# Patient Record
Sex: FEMALE | Race: WHITE | NOT HISPANIC OR LATINO | Employment: OTHER | URBAN - METROPOLITAN AREA
[De-identification: names, ages, dates, MRNs, and addresses within clinical notes are randomized per-mention and may not be internally consistent; named-entity substitution may affect disease eponyms.]

---

## 2022-08-08 ENCOUNTER — OFFICE VISIT (OUTPATIENT)
Dept: RHEUMATOLOGY | Facility: CLINIC | Age: 67
End: 2022-08-08
Payer: MEDICARE

## 2022-08-08 ENCOUNTER — LAB VISIT (OUTPATIENT)
Dept: LAB | Facility: HOSPITAL | Age: 67
End: 2022-08-08
Attending: INTERNAL MEDICINE
Payer: MEDICARE

## 2022-08-08 VITALS
BODY MASS INDEX: 30.16 KG/M2 | WEIGHT: 175.69 LBS | SYSTOLIC BLOOD PRESSURE: 115 MMHG | DIASTOLIC BLOOD PRESSURE: 66 MMHG

## 2022-08-08 DIAGNOSIS — M45.9 ANKYLOSING SPONDYLITIS OF UNSPECIFIED SITES IN SPINE: ICD-10-CM

## 2022-08-08 DIAGNOSIS — M79.7 FIBROMYALGIA: ICD-10-CM

## 2022-08-08 DIAGNOSIS — R76.8 POSITIVE ANA (ANTINUCLEAR ANTIBODY): ICD-10-CM

## 2022-08-08 DIAGNOSIS — Z87.19 HISTORY OF COLITIS: ICD-10-CM

## 2022-08-08 DIAGNOSIS — R76.8 POSITIVE ANA (ANTINUCLEAR ANTIBODY): Primary | ICD-10-CM

## 2022-08-08 LAB
CRP SERPL-MCNC: 0.25 MG/DL
ERYTHROCYTE [SEDIMENTATION RATE] IN BLOOD BY WESTERGREN METHOD: 8 MM/HR (ref 0–20)

## 2022-08-08 PROCEDURE — 3008F BODY MASS INDEX DOCD: CPT | Mod: CPTII,S$GLB,, | Performed by: INTERNAL MEDICINE

## 2022-08-08 PROCEDURE — 1159F PR MEDICATION LIST DOCUMENTED IN MEDICAL RECORD: ICD-10-PCS | Mod: CPTII,S$GLB,, | Performed by: INTERNAL MEDICINE

## 2022-08-08 PROCEDURE — 3078F DIAST BP <80 MM HG: CPT | Mod: CPTII,S$GLB,, | Performed by: INTERNAL MEDICINE

## 2022-08-08 PROCEDURE — 86140 C-REACTIVE PROTEIN: CPT | Performed by: INTERNAL MEDICINE

## 2022-08-08 PROCEDURE — 1101F PT FALLS ASSESS-DOCD LE1/YR: CPT | Mod: CPTII,S$GLB,, | Performed by: INTERNAL MEDICINE

## 2022-08-08 PROCEDURE — 1160F PR REVIEW ALL MEDS BY PRESCRIBER/CLIN PHARMACIST DOCUMENTED: ICD-10-PCS | Mod: CPTII,S$GLB,, | Performed by: INTERNAL MEDICINE

## 2022-08-08 PROCEDURE — 3078F PR MOST RECENT DIASTOLIC BLOOD PRESSURE < 80 MM HG: ICD-10-PCS | Mod: CPTII,S$GLB,, | Performed by: INTERNAL MEDICINE

## 2022-08-08 PROCEDURE — 1160F RVW MEDS BY RX/DR IN RCRD: CPT | Mod: CPTII,S$GLB,, | Performed by: INTERNAL MEDICINE

## 2022-08-08 PROCEDURE — 3288F PR FALLS RISK ASSESSMENT DOCUMENTED: ICD-10-PCS | Mod: CPTII,S$GLB,, | Performed by: INTERNAL MEDICINE

## 2022-08-08 PROCEDURE — 1125F PR PAIN SEVERITY QUANTIFIED, PAIN PRESENT: ICD-10-PCS | Mod: CPTII,S$GLB,, | Performed by: INTERNAL MEDICINE

## 2022-08-08 PROCEDURE — 3008F PR BODY MASS INDEX (BMI) DOCUMENTED: ICD-10-PCS | Mod: CPTII,S$GLB,, | Performed by: INTERNAL MEDICINE

## 2022-08-08 PROCEDURE — 86431 RHEUMATOID FACTOR QUANT: CPT | Performed by: INTERNAL MEDICINE

## 2022-08-08 PROCEDURE — 86200 CCP ANTIBODY: CPT | Performed by: INTERNAL MEDICINE

## 2022-08-08 PROCEDURE — 3074F PR MOST RECENT SYSTOLIC BLOOD PRESSURE < 130 MM HG: ICD-10-PCS | Mod: CPTII,S$GLB,, | Performed by: INTERNAL MEDICINE

## 2022-08-08 PROCEDURE — 86376 MICROSOMAL ANTIBODY EACH: CPT | Performed by: INTERNAL MEDICINE

## 2022-08-08 PROCEDURE — 86147 CARDIOLIPIN ANTIBODY EA IG: CPT | Mod: 59 | Performed by: INTERNAL MEDICINE

## 2022-08-08 PROCEDURE — 86235 NUCLEAR ANTIGEN ANTIBODY: CPT | Mod: 59 | Performed by: INTERNAL MEDICINE

## 2022-08-08 PROCEDURE — 1125F AMNT PAIN NOTED PAIN PRSNT: CPT | Mod: CPTII,S$GLB,, | Performed by: INTERNAL MEDICINE

## 2022-08-08 PROCEDURE — 99205 PR OFFICE/OUTPT VISIT, NEW, LEVL V, 60-74 MIN: ICD-10-PCS | Mod: S$GLB,,, | Performed by: INTERNAL MEDICINE

## 2022-08-08 PROCEDURE — 1101F PR PT FALLS ASSESS DOC 0-1 FALLS W/OUT INJ PAST YR: ICD-10-PCS | Mod: CPTII,S$GLB,, | Performed by: INTERNAL MEDICINE

## 2022-08-08 PROCEDURE — 99205 OFFICE O/P NEW HI 60 MIN: CPT | Mod: S$GLB,,, | Performed by: INTERNAL MEDICINE

## 2022-08-08 PROCEDURE — 1159F MED LIST DOCD IN RCRD: CPT | Mod: CPTII,S$GLB,, | Performed by: INTERNAL MEDICINE

## 2022-08-08 PROCEDURE — 81374 HLA I TYPING 1 ANTIGEN LR: CPT | Performed by: INTERNAL MEDICINE

## 2022-08-08 PROCEDURE — 85732 THROMBOPLASTIN TIME PARTIAL: CPT | Performed by: INTERNAL MEDICINE

## 2022-08-08 PROCEDURE — 3288F FALL RISK ASSESSMENT DOCD: CPT | Mod: CPTII,S$GLB,, | Performed by: INTERNAL MEDICINE

## 2022-08-08 PROCEDURE — 85651 RBC SED RATE NONAUTOMATED: CPT | Performed by: INTERNAL MEDICINE

## 2022-08-08 PROCEDURE — 3074F SYST BP LT 130 MM HG: CPT | Mod: CPTII,S$GLB,, | Performed by: INTERNAL MEDICINE

## 2022-08-08 RX ORDER — RIMEGEPANT SULFATE 75 MG/75MG
TABLET, ORALLY DISINTEGRATING ORAL
COMMUNITY
Start: 2022-08-03

## 2022-08-08 RX ORDER — IBUPROFEN 800 MG/1
800 TABLET ORAL 2 TIMES DAILY
COMMUNITY
Start: 2022-08-03 | End: 2022-08-08

## 2022-08-08 RX ORDER — FLUOXETINE HYDROCHLORIDE 20 MG/1
20 CAPSULE ORAL DAILY
Qty: 30 CAPSULE | Refills: 11 | Status: SHIPPED | OUTPATIENT
Start: 2022-08-08 | End: 2023-08-21

## 2022-08-08 RX ORDER — HYDROXYZINE HYDROCHLORIDE 10 MG/1
10-20 TABLET, FILM COATED ORAL NIGHTLY
COMMUNITY
Start: 2022-07-06 | End: 2023-08-21

## 2022-08-08 RX ORDER — ONDANSETRON 4 MG/1
4 TABLET, FILM COATED ORAL EVERY 8 HOURS PRN
COMMUNITY

## 2022-08-08 NOTE — PROGRESS NOTES
A copy of the previous skin biopsy dated 2022, performed by [facility] in [place] was received.  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  This resulted is scanned into this EMR.        [facility] RHEUMATOLOGY           New patient visit    Notes dictated to M*Modal. Please forgive any unintentional errors.  Subjective:       Patient ID:   NAME: Heidi Romero : 1955     67 y.o. female    Referring Doc: No ref. provider found  Other Physicians:    Chief Complaint:  Positive FUAD    HPI:  This patient was referred by her primary care doctor for the evaluation of a positive FUAD.  The blood test was apparently done by Dermatology in response to a rash that broke out approximately 3 months ago and involved the area overlying the sternum.  She describes the rash as warm, raised, pruritic wheals.  Apparently, a biopsy was done and suggested the possibility of lupus.  I am unclear from reviewing the record whether she was told it was discoid or systemic.  In any case, that rash cleared without scarring.  She subsequently developed rashes involving the arms and legs as well as the abdomen.  She was prescribed Plaquenil which she was unable to tolerate, stating that it made me very sick.  She was also given prednisone which she did not like.  A 2nd biopsy was apparently done and suggested a fixed drug reaction, this according to the patient's memory.  Either the dermatologist or the patient herself decided to discontinue most medications to see whether this would resolve the problem.    Her rheumatologic review systems is positive for alopecia, dryness of the eyes, facial skin rash, dysphagia, and reflux.  The review of systems is equivocal for photosensitivity (? heat), and anemia.    Her past medical history is positive for hyperlipidemia with coronary artery disease needing at least 1 stent.  She also has COPD; she is a former smoker.  She has been treated for  anxiety-depression on at least 2 occasions, having been on Prozac and Zoloft.  She is uncertain as to why these were discontinued.  She does relate having anxiety issues at present.    ROS:   GEN:      no fever, night sweats or weight loss  SKIN:     + rashes, bruising, no Raynauds, +/- photosensitivity  HEENT:  no acute changes in vision, no mouth ulcers, + sicca symptoms, no scalp tenderness, jaw claudication.  CV:        no CP, PND, BOYD or orthopnea, no palpitations  PULM:   + SOB, no cough, hemoptysis, sputum or pleuritic pain  GI:          + dysphagia, ++ GERD, no hematemesis; no abdominal pain, nausea, vomiting, constipation, diarrhea, melanotic stools, + hematochezia  :        no hematuria, dysuria  NEURO: no paresthesias, + headaches, no tremors, no seizures  MUSCULOSKELETAL:  No red, hot, and/or swollen joints  PSYCH: + insomnia, + anxiety > depression    Past Medical/Surgical History:  Past Medical History:   Diagnosis Date    COPD (chronic obstructive pulmonary disease)     Coronary artery disease      Past Surgical History:   Procedure Laterality Date    BREAST BIOPSY Right     over 15 yrs ago benign    HYSTERECTOMY         Allergies:  Review of patient's allergies indicates:  No Known Allergies    Social/Family History:  Social History     Socioeconomic History    Marital status:    Tobacco Use    Smoking status: Current Every Day Smoker     Packs/day: 0.50     Years: 50.00     Pack years: 25.00     Types: Cigarettes     Start date: 9/18/1969    Smokeless tobacco: Never Used    Tobacco comment: Patient has smoked off/on since she was 14     No family history on file.  FAMILY HISTORY:  POSITIVE for Connective Tissue Disease      Medications:    Current Outpatient Medications:     albuterol (VENTOLIN HFA) 90 mcg/actuation inhaler, Inhale 2 puffs into the lungs every 6 (six) hours as needed for Wheezing. Rescue, Disp: , Rfl:     aspirin 81 MG Chew, Take 81 mg by mouth once daily., Disp:  , Rfl:     calcium-vitamin D3 (CALCIUM 500 + D) 500 mg(1,250mg) -200 unit per tablet, Take 1 tablet by mouth 2 (two) times daily with meals., Disp: , Rfl:     carboxymethylcellulose (REFRESH PLUS) 0.5 % Dpet, 1 drop 3 (three) times daily as needed., Disp: , Rfl:     cetirizine (ZYRTEC) 10 MG tablet, Take 10 mg by mouth once daily., Disp: , Rfl:     omeprazole (PRILOSEC) 20 MG capsule, Take 20 mg by mouth once daily., Disp: , Rfl:     ranolazine (RANEXA) 500 MG Tb12, Take 1 tablet (500 mg total) by mouth 2 (two) times daily., Disp: 60 tablet, Rfl: 0    rosuvastatin (CRESTOR) 20 MG tablet, Take 20 mg by mouth once daily., Disp: , Rfl:     ticagrelor (BRILINTA) 90 mg tablet, Take 90 mg by mouth 2 (two) times daily., Disp: , Rfl:     Objective:     Vitals:  There were no vitals taken for this visit.    Physical Examination:   GEN:     wn/wd female in no apparent distress  SKIN:    no rashes, no sclerodactyly, no Raynaud's, no periungual erythema, no digital tip tapering, no nailbed pitting  HEAD:   ++ alopecia, no scalp tenderness, no temporal artery tenderness or induration.  EYES:   + conjunctival pallor, no icterus  ENT:     no thrush, no mucosal dryness or ulcerations, adequate oral hygiene & dentition.  NECK:  supple x 6, no masses, no thyromegaly, no lymphadenopathy.  CV:        S1 and S2, RRR, no murmurs, gallop or rubs  CHEST: Normal respiratory effort;  normal breath sounds/no adventitious sounds. No signs of consolidation.  ABD:      non-tender and non-distended; soft; normal bowel sounds; no rebound, guarding, or tenderness. No hepatosplenomegaly.  Musculoskeletal:  No evidence of inflammatory arthritis of the small joints of the hands and feet.  Moderate degenerative changes noted of both knees.  Muscle strength 5-/5 x 4. Trigger points reactive in 8 of 8 tested locations.  Posture normal, range of motion of the back normal gait normal   EXTREM: no clubbing, cyanosis or edema. normal pulses. Clement's -  x2  NEURO:  grossly intact; motor/sensory WNL; no tremors  PSYCH:  Mental Status Exam      Pt was alert and oriented to date, time and place. Pt was a poor historian throughout the evaluation.      General: Pt presented as well nourished, casually dressed, neatly groomed with good hygiene. Pt had limited eye contact with evaluator but was cooperative.      Speech: Pt's speech was pressured with good articulation.      Thought process is logical, coherent, sequential, organized, and goal-oriented.     Attention span, memory, and concentration appear diminished      Psychomotor activity: Pt ambulates with a steady gait, and did not exhibit psychomotor restlessness or retardation.      Judgement/Insight: Pt's insight and judgment are limited      Intellectual functioning: Consistent with educational level and within normal limits.      Mood and Affect: Pt presents with depressed mood and anxious affect.     Risks: Pt not actively suicidal nor homicidal      Labs:   Lab Results   Component Value Date    WBC 8.46 04/01/2022    HGB 11.9 (L) 04/01/2022    HCT 35.7 (L) 04/01/2022    MCV 86 04/01/2022     04/01/2022   CMP@  Sodium   Date Value Ref Range Status   04/01/2022 138 136 - 145 mmol/L Final     Potassium   Date Value Ref Range Status   04/01/2022 3.8 3.5 - 5.1 mmol/L Final     Chloride   Date Value Ref Range Status   04/01/2022 102 95 - 110 mmol/L Final     CO2   Date Value Ref Range Status   04/01/2022 30 22 - 31 mmol/L Final     Glucose   Date Value Ref Range Status   04/01/2022 108 70 - 110 mg/dL Final     Comment:     The ADA recommends the following guidelines for fasting glucose:    Normal:       less than 100 mg/dL    Prediabetes:  100 mg/dL to 125 mg/dL    Diabetes:     126 mg/dL or higher       BUN   Date Value Ref Range Status   04/01/2022 26 (H) 7 - 18 mg/dL Final     Creatinine   Date Value Ref Range Status   04/01/2022 1.08 0.50 - 1.40 mg/dL Final     Calcium   Date Value Ref Range Status    04/01/2022 9.4 8.4 - 10.2 mg/dL Final     Total Protein   Date Value Ref Range Status   04/01/2022 6.9 6.0 - 8.4 g/dL Final     Albumin   Date Value Ref Range Status   04/01/2022 3.8 3.5 - 5.2 g/dL Final     Total Bilirubin   Date Value Ref Range Status   04/01/2022 0.4 0.2 - 1.3 mg/dL Final     Alkaline Phosphatase   Date Value Ref Range Status   04/01/2022 69 38 - 145 U/L Final     AST   Date Value Ref Range Status   04/01/2022 20 14 - 36 U/L Final     ALT   Date Value Ref Range Status   04/01/2022 20 0 - 35 U/L Final   Mouth.    Radiology/Diagnostic Studies:    None    Assessment/Discussion/Plan:   67 y.o. female with generalized musculoskeletal pain with a history of anxiety-depressive disorder with a physical examination consistent with fibromyalgia  2) POSITIVE FUAD-see note    PLAN:  I am uncertain whether there is an autoimmune rheumatologic process here.  I do not have an FUAD titer and pattern though I do have an FUAD profile which demonstrates a mildly positive anti SSA and slightly positive SSB.  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  I have explained that to the patient and provided her with literature on lupus.  Unfortunately, she feels that she is unable to tolerate hydroxychloroquine and is more or less opposed to take pain steroids by mouth.  I am going to wait for the labs ordered today to be reported and for copies of the biopsies to be forwarded.    Meanwhile, I believe her fibromyalgia is responsible for some of her inability to function.  I have discussed options with her and she has agreed to restart Prozac.  A prescription for fluoxetine 20 mg daily was sent to her pharmacy.  Literature on fibromyalgia was provided.    Aside from the FUAD, lupus anticoagulants and anticardiolipins were ordered as were antithyroid antibodies.  Additionally, a RF/CCP and acute phase reactants were also  ordered.    RTC:  I will see her back in 1 month or sooner if needed              Electronically signed by Reji Gupta MD    Patient notified that this office will be closing December 22, 2022. They should begin looking for another rheumatologist as soon as possible.  A list with the names and contact details of rheumatologists in the surrounding area was provided.      1 hour was spent with this patient.  Ankylosing spondylitis was listed as the reason for ordering an HLA B27.  The correct reason is a history of colitis.

## 2022-08-10 LAB
RHEUMATOID FACT SERPL-ACNC: <10 IU/ML
THYROGLOB AB SERPL-ACNC: <1 IU/ML (ref 0–0.9)
THYROPEROXIDASE AB SERPL-ACNC: 8 IU/ML (ref 0–34)

## 2022-08-10 NOTE — PROGRESS NOTES
This patient has had a couple of skin biopsies.  I would like to see those results before she returns.  Thank you

## 2022-08-11 LAB
ANA PLUS PANEL SEE BELOW:: ABNORMAL
CCP IGA+IGG SERPL IA-ACNC: 8 UNITS (ref 0–19)
CENTROMERE B AB SER-ACNC: <0.2 AI (ref 0–0.9)
CHROMATIN AB SERPL-ACNC: <0.2 AI (ref 0–0.9)
DSDNA AB SER-ACNC: 1 IU/ML (ref 0–9)
ENA JO1 AB SER-ACNC: <0.2 AI (ref 0–0.9)
ENA RNP AB SER-ACNC: 0.6 AI (ref 0–0.9)
ENA SCL70 AB SER-ACNC: <0.2 AI (ref 0–0.9)
ENA SM AB SER-ACNC: <0.2 AI (ref 0–0.9)
ENA SS-A AB SER-ACNC: >8 AI (ref 0–0.9)
ENA SS-B AB SER-ACNC: 6 AI (ref 0–0.9)
RIBOSOMAL P AB SER-ACNC: <0.2 AI (ref 0–0.9)
SMITH/RNP ANTIBODIES: <0.2 AI (ref 0–0.9)

## 2022-08-12 LAB
LA 2 SCREEN W REFLEX-IMP: NORMAL
SCREEN APTT: 26.9 SEC (ref 0–51.9)
SCREEN DRVVT: 37.6 SEC (ref 0–47)

## 2022-08-14 LAB
CARDIOLIPIN IGA SER IA-ACNC: <9 MPL U/ML (ref 0–12)
CARDIOLIPIN IGG SER IA-ACNC: <9 GPL U/ML (ref 0–14)
CARDIOLIPIN IGM SER IA-ACNC: <9 APL U/ML (ref 0–11)

## 2022-08-16 ENCOUNTER — TELEPHONE (OUTPATIENT)
Dept: RHEUMATOLOGY | Facility: CLINIC | Age: 67
End: 2022-08-16

## 2022-08-16 NOTE — TELEPHONE ENCOUNTER
I will go over those results with her at her next appointment.  If she wants to move that appointment up, that is fine with me.  Thanks

## 2022-08-18 LAB — HLA-B27 QL NAA+PROBE: NEGATIVE

## 2022-09-19 ENCOUNTER — OFFICE VISIT (OUTPATIENT)
Dept: RHEUMATOLOGY | Facility: CLINIC | Age: 67
End: 2022-09-19
Payer: MEDICARE

## 2022-09-19 VITALS
WEIGHT: 176.69 LBS | SYSTOLIC BLOOD PRESSURE: 110 MMHG | DIASTOLIC BLOOD PRESSURE: 74 MMHG | BODY MASS INDEX: 30.33 KG/M2

## 2022-09-19 DIAGNOSIS — R76.8 POSITIVE ANA (ANTINUCLEAR ANTIBODY): Primary | ICD-10-CM

## 2022-09-19 DIAGNOSIS — D89.89: ICD-10-CM

## 2022-09-19 DIAGNOSIS — L93.1 SUBACUTE CUTANEOUS LUPUS ERYTHEMATOSUS: Primary | ICD-10-CM

## 2022-09-19 PROCEDURE — 1159F MED LIST DOCD IN RCRD: CPT | Mod: CPTII,S$GLB,, | Performed by: INTERNAL MEDICINE

## 2022-09-19 PROCEDURE — 99215 OFFICE O/P EST HI 40 MIN: CPT | Mod: S$GLB,,, | Performed by: INTERNAL MEDICINE

## 2022-09-19 PROCEDURE — 99417 PROLNG OP E/M EACH 15 MIN: CPT | Mod: S$GLB,,, | Performed by: INTERNAL MEDICINE

## 2022-09-19 PROCEDURE — 99417 PR PROLONGED SVC, OUTPT, W/WO DIRECT PT CONTACT,  EA ADDTL 15 MIN: ICD-10-PCS | Mod: S$GLB,,, | Performed by: INTERNAL MEDICINE

## 2022-09-19 PROCEDURE — 3074F SYST BP LT 130 MM HG: CPT | Mod: CPTII,S$GLB,, | Performed by: INTERNAL MEDICINE

## 2022-09-19 PROCEDURE — 3288F PR FALLS RISK ASSESSMENT DOCUMENTED: ICD-10-PCS | Mod: CPTII,S$GLB,, | Performed by: INTERNAL MEDICINE

## 2022-09-19 PROCEDURE — 1160F RVW MEDS BY RX/DR IN RCRD: CPT | Mod: CPTII,S$GLB,, | Performed by: INTERNAL MEDICINE

## 2022-09-19 PROCEDURE — 3078F PR MOST RECENT DIASTOLIC BLOOD PRESSURE < 80 MM HG: ICD-10-PCS | Mod: CPTII,S$GLB,, | Performed by: INTERNAL MEDICINE

## 2022-09-19 PROCEDURE — 1160F PR REVIEW ALL MEDS BY PRESCRIBER/CLIN PHARMACIST DOCUMENTED: ICD-10-PCS | Mod: CPTII,S$GLB,, | Performed by: INTERNAL MEDICINE

## 2022-09-19 PROCEDURE — 1101F PR PT FALLS ASSESS DOC 0-1 FALLS W/OUT INJ PAST YR: ICD-10-PCS | Mod: CPTII,S$GLB,, | Performed by: INTERNAL MEDICINE

## 2022-09-19 PROCEDURE — 1125F AMNT PAIN NOTED PAIN PRSNT: CPT | Mod: CPTII,S$GLB,, | Performed by: INTERNAL MEDICINE

## 2022-09-19 PROCEDURE — 99215 PR OFFICE/OUTPT VISIT, EST, LEVL V, 40-54 MIN: ICD-10-PCS | Mod: S$GLB,,, | Performed by: INTERNAL MEDICINE

## 2022-09-19 PROCEDURE — 3008F BODY MASS INDEX DOCD: CPT | Mod: CPTII,S$GLB,, | Performed by: INTERNAL MEDICINE

## 2022-09-19 PROCEDURE — 3288F FALL RISK ASSESSMENT DOCD: CPT | Mod: CPTII,S$GLB,, | Performed by: INTERNAL MEDICINE

## 2022-09-19 PROCEDURE — 1125F PR PAIN SEVERITY QUANTIFIED, PAIN PRESENT: ICD-10-PCS | Mod: CPTII,S$GLB,, | Performed by: INTERNAL MEDICINE

## 2022-09-19 PROCEDURE — 3078F DIAST BP <80 MM HG: CPT | Mod: CPTII,S$GLB,, | Performed by: INTERNAL MEDICINE

## 2022-09-19 PROCEDURE — 1159F PR MEDICATION LIST DOCUMENTED IN MEDICAL RECORD: ICD-10-PCS | Mod: CPTII,S$GLB,, | Performed by: INTERNAL MEDICINE

## 2022-09-19 PROCEDURE — 1101F PT FALLS ASSESS-DOCD LE1/YR: CPT | Mod: CPTII,S$GLB,, | Performed by: INTERNAL MEDICINE

## 2022-09-19 PROCEDURE — 3008F PR BODY MASS INDEX (BMI) DOCUMENTED: ICD-10-PCS | Mod: CPTII,S$GLB,, | Performed by: INTERNAL MEDICINE

## 2022-09-19 PROCEDURE — 3074F PR MOST RECENT SYSTOLIC BLOOD PRESSURE < 130 MM HG: ICD-10-PCS | Mod: CPTII,S$GLB,, | Performed by: INTERNAL MEDICINE

## 2022-09-19 RX ORDER — VERAPAMIL HYDROCHLORIDE 40 MG/1
40 TABLET ORAL 2 TIMES DAILY
COMMUNITY
Start: 2022-08-29 | End: 2023-08-21

## 2022-09-19 NOTE — PROGRESS NOTES
[facility] RHEUMATOLOGY           Follow-up visit    Notes dictated to M*Modal. Please forgive any unintended errors.  Subjective:       Patient ID:   NAME: Heidi Romero : 1955     67 y.o. female    Referring Doc: No ref. provider found  Other Physicians:    Chief Complaint:  Positive FUAD      HPI/Interval History:  the patient presents to discuss the results of her blood testing.  She is feeling well and has had no new skin rashes.    ROS:   GEN:    no fever, night sweats or weight loss  SKIN:   no new rashes, bruising, or swelling, no Raynauds, + photosensitivity      Past Medical/Surgical History:  Past Medical History:   Diagnosis Date    COPD (chronic obstructive pulmonary disease)     Coronary artery disease      Past Surgical History:   Procedure Laterality Date    BREAST BIOPSY Right     over 15 yrs ago benign    HYSTERECTOMY         Allergies:  Review of patient's allergies indicates:   Allergen Reactions    Codeine      Other reaction(s): Abdominal Pain    Hydrochlorothiazide     Triamterene        Social/Family History:  Social History     Socioeconomic History    Marital status:    Tobacco Use    Smoking status: Every Day     Packs/day: 0.50     Years: 50.00     Pack years: 25.00     Types: Cigarettes     Start date: 1969    Smokeless tobacco: Never    Tobacco comments:     Patient has smoked off/on since she was 14     History reviewed. No pertinent family history.  FAMILY HISTORY: negative for Connective Tissue Disease      Medications:    Current Outpatient Medications:     albuterol (PROVENTIL/VENTOLIN HFA) 90 mcg/actuation inhaler, Inhale 2 puffs into the lungs every 6 (six) hours as needed for Wheezing. Rescue, Disp: , Rfl:     aspirin 81 MG Chew, Take 81 mg by mouth once daily., Disp: , Rfl:     calcium-vitamin D3 (OS- + D3) 500 mg-5 mcg (200 unit) per tablet, Take 1 tablet by mouth 2 (two) times daily with meals., Disp: , Rfl:     carboxymethylcellulose (REFRESH  PLUS) 0.5 % Dpet, 1 drop 3 (three) times daily as needed., Disp: , Rfl:     cetirizine (ZYRTEC) 10 MG tablet, Take 10 mg by mouth once daily., Disp: , Rfl:     ergocalciferol, vitamin D2, (VITAMIN D ORAL), Take by mouth., Disp: , Rfl:     FLUoxetine 20 MG capsule, Take 1 capsule (20 mg total) by mouth once daily., Disp: 30 capsule, Rfl: 11    hydrOXYzine HCL (ATARAX) 10 MG Tab, Take 10-20 mg by mouth nightly., Disp: , Rfl:     NURTEC 75 mg odt, SMARTSI Tablet(s) Sublingual, Disp: , Rfl:     ondansetron (ZOFRAN) 4 MG tablet, Take 4 mg by mouth every 8 (eight) hours as needed for Nausea., Disp: , Rfl:     rosuvastatin (CRESTOR) 20 MG tablet, Take 20 mg by mouth once daily., Disp: , Rfl:     verapamiL (CALAN) 40 MG Tab, Take 40 mg by mouth 2 (two) times daily. Only takes once a day, Disp: , Rfl:       Objective:     Vitals:  Blood pressure 110/74, weight 80.2 kg (176 lb 11.2 oz).    Physical Examination:   GEN: wn/wd female in no apparent distress  SKIN: no rashes, no sclerodactyly, no Raynaud's  NEURO:  grossly intact ; no tremors  PSYCH:  normal mood, affect and behavior    Labs:   Lab Results   Component Value Date    WBC 8.46 2022    HGB 11.9 (L) 2022    HCT 35.7 (L) 2022    MCV 86 2022     2022   CMP@  Sodium   Date Value Ref Range Status   2022 138 136 - 145 mmol/L Final     Potassium   Date Value Ref Range Status   2022 3.8 3.5 - 5.1 mmol/L Final     Chloride   Date Value Ref Range Status   2022 102 95 - 110 mmol/L Final     CO2   Date Value Ref Range Status   2022 30 22 - 31 mmol/L Final     Glucose   Date Value Ref Range Status   2022 108 70 - 110 mg/dL Final     Comment:     The ADA recommends the following guidelines for fasting glucose:    Normal:       less than 100 mg/dL    Prediabetes:  100 mg/dL to 125 mg/dL    Diabetes:     126 mg/dL or higher       BUN   Date Value Ref Range Status   2022 26 (H) 7 - 18 mg/dL Final      Creatinine   Date Value Ref Range Status   04/01/2022 1.08 0.50 - 1.40 mg/dL Final     Calcium   Date Value Ref Range Status   04/01/2022 9.4 8.4 - 10.2 mg/dL Final     Total Protein   Date Value Ref Range Status   04/01/2022 6.9 6.0 - 8.4 g/dL Final     Albumin   Date Value Ref Range Status   04/01/2022 3.8 3.5 - 5.2 g/dL Final     Total Bilirubin   Date Value Ref Range Status   04/01/2022 0.4 0.2 - 1.3 mg/dL Final     Alkaline Phosphatase   Date Value Ref Range Status   04/01/2022 69 38 - 145 U/L Final     AST   Date Value Ref Range Status   04/01/2022 20 14 - 36 U/L Final     ALT   Date Value Ref Range Status   04/01/2022 20 0 - 35 U/L Final     CRP   Date Value Ref Range Status   08/08/2022 0.25 <0.76 mg/dL Final     Rheumatoid Factor   Date Value Ref Range Status   08/08/2022 <10.0 <14.0 IU/mL Final     Comment:     Performed at:  [facility]  [id number]  : Jorge Shay MD, Phone:  [phone]         Radiology/Diagnostic Studies:    None    Assessment/Discussion/Plan:    67 y.o. 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  Unfortunately, she feels strongly that she had an allergic reaction to it in the past.  I note however that her chart reports an allergy to hydrochlorothiazide.  Perhaps she is confused about exactly which drug she reacted to.  She will contact her dermatologist and verify that history tomorrow.  If in fact it is HCTZ that she reacted to, we should be able to start her on Plaquenil.     See previous dermatology note from Dr. Verónica Shah dated 05/24/2022.    I will addend this record after I hear from her again    RTC: to be determined        Electronically signed by  Reji Gupta MD      Patient notified that this office will be closing December 22, 2022. They should begin looking for another rheumatologist as soon as possible.  A list with the names and contact details of rheumatologists in the surrounding area was provided.

## 2022-10-18 ENCOUNTER — TELEPHONE (OUTPATIENT)
Dept: RHEUMATOLOGY | Facility: CLINIC | Age: 67
End: 2022-10-18
Payer: MEDICARE

## 2022-10-18 NOTE — TELEPHONE ENCOUNTER
----- Message from Katie Grey sent at 10/18/2022 10:24 AM CDT -----  Contact: pt at [phone]  Type:  Sooner Appointment Request    Caller is requesting a sooner appointment.  Caller declined first available appointment listed below.  Caller will not accept being placed on the waitlist and is requesting a message be sent to doctor.    Name of Caller:  pt's daughter  When is the first available appointment?  N/A  Symptoms:  Lupus of the skin  Best Call Back Number:  [phone]  Additional Information:  pt is being seen by Dr Gupta and needs to continue being seen by a Rheumatologist. Please call back to advise.

## 2022-11-28 ENCOUNTER — TELEPHONE (OUTPATIENT)
Dept: RHEUMATOLOGY | Facility: CLINIC | Age: 67
End: 2022-11-28
Payer: MEDICARE

## 2022-11-28 NOTE — TELEPHONE ENCOUNTER
Patient has soonest appointment message sent to staff to call if something opens up  ----- Message from Ilene Stroud RN sent at 11/26/2022 11:06 AM CST -----  Regarding: [facility] ER visit f/u 11/25/2022 Dx: Uriticaria  Please call patient to schedule an earlier appointment if at all possible. She was seen in the ER 11/25/2022.     Thanks,    Ilene Stroud RN, BSN  ED Navigator/Case Management  [phone]

## 2022-12-21 ENCOUNTER — TELEPHONE (OUTPATIENT)
Dept: NEPHROLOGY | Facility: CLINIC | Age: 67
End: 2022-12-21
Payer: MEDICARE

## 2022-12-21 ENCOUNTER — TELEPHONE (OUTPATIENT)
Dept: HEMATOLOGY/ONCOLOGY | Facility: CLINIC | Age: 67
End: 2022-12-21

## 2022-12-21 NOTE — TELEPHONE ENCOUNTER
Pts. Daughter stated she had a referral in system to see Nephrology - do not see- she will call doctors office - will get appt. To see Dr. Padgett per nurse - left message for daughter appt. Date and time -

## 2022-12-21 NOTE — TELEPHONE ENCOUNTER
----- Message from Amada Crawford sent at 12/21/2022 11:39 AM CST -----  Pt's dtr Peg is calling to check the status of a referral for her mom from Dr. Escobedo. She lives in [place] and would like for you to call her to make her appt.     Peg Stewart  [phone]

## 2022-12-21 NOTE — TELEPHONE ENCOUNTER
----- Message from Josephine Olvera sent at 12/21/2022 11:11 AM CST -----  Contact: [phone]  Type: Needs Medical Advice  Who Called:  Peg powell daughter     Best Call Back Number: [phone]  Additional Information: Pts daughter is calling to schedule NP appt. Pls call back and adivse

## 2022-12-27 ENCOUNTER — OFFICE VISIT (OUTPATIENT)
Dept: NEPHROLOGY | Facility: CLINIC | Age: 67
End: 2022-12-27
Payer: MEDICARE

## 2022-12-27 VITALS
DIASTOLIC BLOOD PRESSURE: 76 MMHG | HEART RATE: 109 BPM | OXYGEN SATURATION: 96 % | WEIGHT: 175 LBS | SYSTOLIC BLOOD PRESSURE: 116 MMHG | HEIGHT: 64 IN | BODY MASS INDEX: 29.88 KG/M2

## 2022-12-27 DIAGNOSIS — R76.8 ANA POSITIVE: Primary | ICD-10-CM

## 2022-12-27 PROCEDURE — 3074F PR MOST RECENT SYSTOLIC BLOOD PRESSURE < 130 MM HG: ICD-10-PCS | Mod: CPTII,S$GLB,, | Performed by: INTERNAL MEDICINE

## 2022-12-27 PROCEDURE — 1159F MED LIST DOCD IN RCRD: CPT | Mod: CPTII,S$GLB,, | Performed by: INTERNAL MEDICINE

## 2022-12-27 PROCEDURE — 3066F NEPHROPATHY DOC TX: CPT | Mod: CPTII,S$GLB,, | Performed by: INTERNAL MEDICINE

## 2022-12-27 PROCEDURE — 3078F PR MOST RECENT DIASTOLIC BLOOD PRESSURE < 80 MM HG: ICD-10-PCS | Mod: CPTII,S$GLB,, | Performed by: INTERNAL MEDICINE

## 2022-12-27 PROCEDURE — 3008F PR BODY MASS INDEX (BMI) DOCUMENTED: ICD-10-PCS | Mod: CPTII,S$GLB,, | Performed by: INTERNAL MEDICINE

## 2022-12-27 PROCEDURE — 1159F PR MEDICATION LIST DOCUMENTED IN MEDICAL RECORD: ICD-10-PCS | Mod: CPTII,S$GLB,, | Performed by: INTERNAL MEDICINE

## 2022-12-27 PROCEDURE — 99999 PR PBB SHADOW E&M-EST. PATIENT-LVL IV: CPT | Mod: PBBFAC,,, | Performed by: INTERNAL MEDICINE

## 2022-12-27 PROCEDURE — 3008F BODY MASS INDEX DOCD: CPT | Mod: CPTII,S$GLB,, | Performed by: INTERNAL MEDICINE

## 2022-12-27 PROCEDURE — 3288F PR FALLS RISK ASSESSMENT DOCUMENTED: ICD-10-PCS | Mod: CPTII,S$GLB,, | Performed by: INTERNAL MEDICINE

## 2022-12-27 PROCEDURE — 1101F PT FALLS ASSESS-DOCD LE1/YR: CPT | Mod: CPTII,S$GLB,, | Performed by: INTERNAL MEDICINE

## 2022-12-27 PROCEDURE — 99999 PR PBB SHADOW E&M-EST. PATIENT-LVL IV: ICD-10-PCS | Mod: PBBFAC,,, | Performed by: INTERNAL MEDICINE

## 2022-12-27 PROCEDURE — 1160F PR REVIEW ALL MEDS BY PRESCRIBER/CLIN PHARMACIST DOCUMENTED: ICD-10-PCS | Mod: CPTII,S$GLB,, | Performed by: INTERNAL MEDICINE

## 2022-12-27 PROCEDURE — 3066F PR DOCUMENTATION OF TREATMENT FOR NEPHROPATHY: ICD-10-PCS | Mod: CPTII,S$GLB,, | Performed by: INTERNAL MEDICINE

## 2022-12-27 PROCEDURE — 99204 PR OFFICE/OUTPT VISIT, NEW, LEVL IV, 45-59 MIN: ICD-10-PCS | Mod: S$GLB,,, | Performed by: INTERNAL MEDICINE

## 2022-12-27 PROCEDURE — 3074F SYST BP LT 130 MM HG: CPT | Mod: CPTII,S$GLB,, | Performed by: INTERNAL MEDICINE

## 2022-12-27 PROCEDURE — 99204 OFFICE O/P NEW MOD 45 MIN: CPT | Mod: S$GLB,,, | Performed by: INTERNAL MEDICINE

## 2022-12-27 PROCEDURE — 1101F PR PT FALLS ASSESS DOC 0-1 FALLS W/OUT INJ PAST YR: ICD-10-PCS | Mod: CPTII,S$GLB,, | Performed by: INTERNAL MEDICINE

## 2022-12-27 PROCEDURE — 3078F DIAST BP <80 MM HG: CPT | Mod: CPTII,S$GLB,, | Performed by: INTERNAL MEDICINE

## 2022-12-27 PROCEDURE — 1160F RVW MEDS BY RX/DR IN RCRD: CPT | Mod: CPTII,S$GLB,, | Performed by: INTERNAL MEDICINE

## 2022-12-27 PROCEDURE — 3288F FALL RISK ASSESSMENT DOCD: CPT | Mod: CPTII,S$GLB,, | Performed by: INTERNAL MEDICINE

## 2022-12-27 RX ORDER — MULTIVITAMIN
1 TABLET ORAL DAILY
COMMUNITY

## 2022-12-27 RX ORDER — LEVOTHYROXINE SODIUM 100 UG/1
25 TABLET ORAL
COMMUNITY

## 2022-12-27 RX ORDER — DAPSONE 25 MG/1
25 TABLET ORAL 2 TIMES DAILY
COMMUNITY

## 2022-12-27 RX ORDER — ALPRAZOLAM 0.25 MG/1
TABLET ORAL 3 TIMES DAILY
COMMUNITY
End: 2023-08-21 | Stop reason: SDUPTHER

## 2022-12-27 RX ORDER — IBUPROFEN 800 MG/1
800 TABLET ORAL EVERY 6 HOURS PRN
COMMUNITY

## 2022-12-27 RX ORDER — PREDNISONE 10 MG/1
10 TABLET ORAL DAILY
COMMUNITY
End: 2023-08-21

## 2022-12-27 NOTE — PROGRESS NOTES
Subjective:       Patient ID: Heidi Romero is a 67 y.o. White female who presents for new patient evaluation for renal disease.    Heidi Romero is referred by Abiola Escobedo MD to be evaluated for renal disease.  She has had a change in her health in April of this year with decreased activity and arthralgias along with dyspnea and nausea.  She has no uremic or urinary symptoms.        Review of Systems   Constitutional:  Negative for appetite change, chills and fever.   HENT:  Negative for congestion.    Eyes:  Negative for visual disturbance.   Respiratory:  Positive for shortness of breath. Negative for cough.    Cardiovascular:  Positive for leg swelling (occasional). Negative for chest pain.   Gastrointestinal:  Positive for nausea (occasional). Negative for abdominal pain, diarrhea and vomiting.   Genitourinary:  Positive for urgency (occasional). Negative for difficulty urinating, dysuria and hematuria.   Musculoskeletal:  Positive for arthralgias (L arm, hands) and neck pain. Negative for myalgias.   Skin:  Negative for rash.   Neurological:  Negative for headaches.   Psychiatric/Behavioral:  Negative for sleep disturbance.      The past medical, family and social histories were reviewed for this encounter.     Past Medical History:   Diagnosis Date    COPD (chronic obstructive pulmonary disease)     Coronary artery disease      Past Surgical History:   Procedure Laterality Date    BREAST BIOPSY Right     over 15 yrs ago benign    HYSTERECTOMY       Social History     Socioeconomic History    Marital status:    Tobacco Use    Smoking status: Every Day     Packs/day: 0.50     Years: 50.00     Pack years: 25.00     Types: Cigarettes     Start date: 9/18/1969    Smokeless tobacco: Never    Tobacco comments:     Patient has smoked off/on since she was 14     Current Outpatient Medications   Medication Sig    ALPRAZolam (XANAX) 0.25 MG tablet Take by mouth 3 (three) times daily.    aspirin 81 MG Chew  "Take 81 mg by mouth once daily.    calcium-vitamin D3 (OS- + D3) 500 mg-5 mcg (200 unit) per tablet Take 1 tablet by mouth 2 (two) times daily with meals.    carboxymethylcellulose (REFRESH PLUS) 0.5 % Dpet 1 drop 3 (three) times daily as needed.    cetirizine (ZYRTEC) 10 MG tablet Take 10 mg by mouth once daily.    dapsone 25 MG Tab Take 25 mg by mouth 2 (two) times daily.    ergocalciferol, vitamin D2, (VITAMIN D ORAL) Take by mouth.    FLUoxetine 20 MG capsule Take 1 capsule (20 mg total) by mouth once daily.    hydrOXYzine HCL (ATARAX) 10 MG Tab Take 10-20 mg by mouth nightly. prn    ibuprofen (ADVIL,MOTRIN) 800 MG tablet Take 800 mg by mouth every 6 (six) hours as needed for Pain.    levothyroxine (SYNTHROID) 100 MCG tablet Take 25 mcg by mouth before breakfast.    multivitamin (THERAGRAN) per tablet Take 1 tablet by mouth once daily. Women's centrum    NURTEC 75 mg odt SMARTSI Tablet(s) Sublingual    ondansetron (ZOFRAN) 4 MG tablet Take 4 mg by mouth every 8 (eight) hours as needed for Nausea.    predniSONE (DELTASONE) 10 MG tablet Take 10 mg by mouth once daily.    rosuvastatin (CRESTOR) 20 MG tablet Take 40 mg by mouth once daily.    albuterol (PROVENTIL/VENTOLIN HFA) 90 mcg/actuation inhaler Inhale 2 puffs into the lungs every 6 (six) hours as needed for Wheezing. Rescue    famotidine (PEPCID) 20 MG tablet Take 1 tablet (20 mg total) by mouth 2 (two) times daily. for 5 days    verapamiL (CALAN) 40 MG Tab Take 40 mg by mouth 2 (two) times daily. Only takes once a day     No current facility-administered medications for this visit.       /76 (BP Location: Right arm, Patient Position: Sitting, BP Method: Large (Manual))   Pulse 109   Ht 5' 4" (1.626 m)   Wt 79.4 kg (175 lb)   SpO2 96%   BMI 30.04 kg/m²     Objective:      Physical Exam  Vitals reviewed.   Constitutional:       General: She is not in acute distress.     Appearance: She is well-developed.   HENT:      Head: Normocephalic " and atraumatic.   Eyes:      General: No scleral icterus.     Conjunctiva/sclera: Conjunctivae normal.   Neck:      Vascular: No JVD.   Cardiovascular:      Rate and Rhythm: Normal rate and regular rhythm.      Heart sounds: Normal heart sounds. No murmur heard.    No friction rub. No gallop.   Pulmonary:      Effort: Pulmonary effort is normal. No respiratory distress.      Breath sounds: Normal breath sounds. No wheezing or rales.   Abdominal:      General: Bowel sounds are normal. There is no distension.      Palpations: Abdomen is soft.      Tenderness: There is no abdominal tenderness.   Musculoskeletal:      Cervical back: Normal range of motion.      Right lower leg: No edema.      Left lower leg: No edema.   Skin:     General: Skin is warm and dry.      Findings: No rash.   Neurological:      Mental Status: She is alert and oriented to person, place, and time.   Psychiatric:         Mood and Affect: Mood normal.         Behavior: Behavior normal.       Assessment:       1. FUAD positive        Plan:   Return to clinic prn.  Labs for next visit include rp us micro US this week or next.  Baseline creatinine is 1.0-1.1 whic is her age-appropriate baseline.  She has a history of a positive FUAD. I will check her urine and US.  If her urine sediment is benign and she has no proteinuria then I would not pursue further with an age-appropriate Scr..

## 2023-01-24 ENCOUNTER — TELEPHONE (OUTPATIENT)
Dept: NEPHROLOGY | Facility: CLINIC | Age: 68
End: 2023-01-24
Payer: MEDICARE

## 2023-01-24 NOTE — TELEPHONE ENCOUNTER
----- Message from [facility] sent at 1/24/2023  9:13 AM CST -----  Who Called:DAUGHTER SANTIAGO       What is the reqeust in detail:PT IS REQUESTING CALL BACK TO GO OVER LAB WORK .PLEASE  ADVISE       Can the clinic reply by [facility]? NO       Best Call Back Number:[phone] LEAVE A DETAILED MESSG. WITH A EXT FOR A NURSE       Additional Information:

## 2023-01-24 NOTE — TELEPHONE ENCOUNTER
Please comment on the patient labs.     She was referred by Dr Escobedo for concern.     Note says RTC PRN, but they never got lab results.    [facility]

## 2023-01-28 NOTE — TELEPHONE ENCOUNTER
Sorry for the delay.  Her MyChart is not active.    Her kidney function blood test is normal (creatinine is just where it should be).    Urine had no abnormalities and has no protein and a normal amount of red blood cells.    No further workup needed.

## 2023-02-16 PROBLEM — Z83.2 FAMILY HISTORY OF ITP: Status: ACTIVE | Noted: 2023-02-16

## 2023-02-16 PROBLEM — D64.9 ANEMIA, UNSPECIFIED: Status: ACTIVE | Noted: 2023-02-16

## 2023-02-16 PROBLEM — N18.9 ANEMIA IN CHRONIC RENAL DISEASE: Status: ACTIVE | Noted: 2023-02-16

## 2023-02-16 PROBLEM — D63.1 ANEMIA IN CHRONIC RENAL DISEASE: Status: ACTIVE | Noted: 2023-02-16

## 2023-02-16 PROBLEM — D64.9 ANEMIA, NORMOCYTIC NORMOCHROMIC: Status: ACTIVE | Noted: 2023-02-16

## 2023-02-16 NOTE — PROGRESS NOTES
[facility] Hematolgy/Oncology  History & Physical    Subjective:      Patient ID:   NAME: Heidi Romero : 1955     67 y.o. female    Referring Doc: self-referral  Other Physicians: Gilma Escobedo (PCP); Jose Padgett (Neph); VANCE Gupta (retired); Verónica Shah (derm at [place]); Sonya (Card)        Chief Complaint: fam hx/of ITP      HPI:  67 y.o. female with diagnosis of family history of ITP (sister) who has been self-referred for evaluation by medical hematology/oncology. She is here with her son. She has been diagnosed with Lupus in 2022. She has chronic skin issues with rash. She was previously seeing Dr GILBERT Gupta with rheumatology but he has since retired and she plans to see new rheumatologist in the future. She has some fatigue, general malaise. Breathing ok with some BOYD. She is on xanax for anxiety.     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    She is seeing Dr Staley with neurology and [facility]. She is planning to have angiogram of brain in near future at [place].     S/p prednisone 10mg s/p slow taper off when she had rash and she has been off steroids for about a month now            ROS:   GEN: general malaise; fatigue  HEENT: normal with no HA's, sore throat, stiff neck, changes in vision  CV: normal with no CP, SOB, PND, intermittent BOYD  PULM: normal with no SOB, cough, hemoptysis, sputum or pleuritic pain  GI: normal with no abdominal pain, nausea, vomiting, constipation, diarrhea, melanotic stools, BRBPR, or hematemesis  : normal with no hematuria, dysuria  BREAST: normal with no mass, discharge, pain  SKIN: chronic rash issues       Past Medical/Surgical History:  Past Medical History:   Diagnosis Date    Anemia in chronic renal disease 2023    Anemia, normocytic normochromic 2023    Anemia, unspecified 2023    COPD (chronic obstructive  pulmonary disease)     Coronary artery disease     Family history of ITP 2/16/2023     Past Surgical History:   Procedure Laterality Date    BREAST BIOPSY Right     over 15 yrs ago benign    HYSTERECTOMY           Allergies:  Review of patient's allergies indicates:   Allergen Reactions    Vistaril [hydroxyzine hcl] Swelling    Codeine      Other reaction(s): Abdominal Pain    Hydrochlorothiazide     Triamterene        Social/Family History:  Social History     Socioeconomic History    Marital status:    Tobacco Use    Smoking status: Every Day     Packs/day: 0.50     Years: 50.00     Pack years: 25.00     Types: Cigarettes     Start date: 9/18/1969    Smokeless tobacco: Never    Tobacco comments:     Patient has smoked off/on since she was 14     History reviewed. No pertinent family history.      Medications:    Current Outpatient Medications:     ALPRAZolam (XANAX) 0.25 MG tablet, Take by mouth 3 (three) times daily., Disp: , Rfl:     aspirin 81 MG Chew, Take 81 mg by mouth once daily., Disp: , Rfl:     calcium-vitamin D3 (OS- + D3) 500 mg-5 mcg (200 unit) per tablet, Take 1 tablet by mouth 2 (two) times daily with meals., Disp: , Rfl:     carboxymethylcellulose (REFRESH PLUS) 0.5 % Dpet, 1 drop 3 (three) times daily as needed., Disp: , Rfl:     cetirizine (ZYRTEC) 10 MG tablet, Take 10 mg by mouth once daily., Disp: , Rfl:     dapsone 25 MG Tab, Take 25 mg by mouth 2 (two) times daily., Disp: , Rfl:     ergocalciferol, vitamin D2, (VITAMIN D ORAL), Take by mouth., Disp: , Rfl:     FLUoxetine 20 MG capsule, Take 1 capsule (20 mg total) by mouth once daily., Disp: 30 capsule, Rfl: 11    hydrOXYzine HCL (ATARAX) 10 MG Tab, Take 10-20 mg by mouth nightly. prn, Disp: , Rfl:     ibuprofen (ADVIL,MOTRIN) 800 MG tablet, Take 800 mg by mouth every 6 (six) hours as needed for Pain., Disp: , Rfl:     levothyroxine (SYNTHROID) 100 MCG tablet, Take 25 mcg by mouth before breakfast., Disp: , Rfl:      "multivitamin (THERAGRAN) per tablet, Take 1 tablet by mouth once daily. Women's centrum, Disp: , Rfl:     NURTEC 75 mg odt, SMARTSI Tablet(s) Sublingual, Disp: , Rfl:     ondansetron (ZOFRAN) 4 MG tablet, Take 4 mg by mouth every 8 (eight) hours as needed for Nausea., Disp: , Rfl:     predniSONE (DELTASONE) 10 MG tablet, Take 10 mg by mouth once daily., Disp: , Rfl:     rosuvastatin (CRESTOR) 20 MG tablet, Take 40 mg by mouth once daily., Disp: , Rfl:     verapamiL (CALAN) 40 MG Tab, Take 40 mg by mouth 2 (two) times daily. Only takes once a day, Disp: , Rfl:     famotidine (PEPCID) 20 MG tablet, Take 1 tablet (20 mg total) by mouth 2 (two) times daily. for 5 days, Disp: 10 tablet, Rfl: 0      Pathology:   Cancer Staging   No matching staging information was found for the patient.      Objective:   Vitals:  Blood pressure (!) 115/55, pulse 95, temperature 97.7 °F (36.5 °C), resp. rate 18, height 5' 4" (1.626 m), weight 80.4 kg (177 lb 4.8 oz).    Physical Examination:   GEN: no apparent distress, comfortable; AAOx3  HEAD: atraumatic and normocephalic  EYES: no pallor, no icterus, PERRLA  ENT: OMM, no pharyngeal erythema, external ears WNL; no nasal discharge; no thrush  NECK: no masses, thyroid normal, trachea midline, no LAD/LN's, supple  CV: RRR with no murmur; normal pulse; normal S1 and S2; no pedal edema  CHEST: Normal respiratory effort; CTAB; normal breath sounds; no wheeze or crackles  ABDOM: nontender and nondistended; soft; normal bowel sounds; no rebound/guarding  MUSC/Skeletal: ROM normal; no crepitus; joints normal; no deformities or arthropathy  EXTREM: no clubbing, cyanosis, inflammation or swelling  SKIN: no rashes, lesions, ulcers, petechiae or subcutaneous nodules; mild residual rash on neck/anterior chest  : no king  NEURO: grossly intact; motor/sensory WNL; AAOx3; no tremors  PSYCH: normal mood, affect and behavior; a little anxious  LYMPH: normal cervical, supraclavicular, axillary and " samuel PINZON's      Labs:   Lab Results   Component Value Date    WBC 8.46 04/01/2022    HGB 11.9 (L) 04/01/2022    HCT 35.7 (L) 04/01/2022    MCV 86 04/01/2022     04/01/2022    CMP  Sodium   Date Value Ref Range Status   12/31/2022 140 136 - 145 mmol/L Final     Potassium   Date Value Ref Range Status   12/31/2022 3.4 (L) 3.5 - 5.1 mmol/L Final     Chloride   Date Value Ref Range Status   12/31/2022 107 95 - 110 mmol/L Final     CO2   Date Value Ref Range Status   12/31/2022 27 22 - 31 mmol/L Final     Glucose   Date Value Ref Range Status   12/31/2022 122 (H) 70 - 110 mg/dL Final     Comment:     The ADA recommends the following guidelines for fasting glucose:    Normal:       less than 100 mg/dL    Prediabetes:  100 mg/dL to 125 mg/dL    Diabetes:     126 mg/dL or higher       BUN   Date Value Ref Range Status   12/31/2022 19 (H) 7 - 18 mg/dL Final     Creatinine   Date Value Ref Range Status   12/31/2022 1.05 0.50 - 1.40 mg/dL Final     Calcium   Date Value Ref Range Status   12/31/2022 9.0 8.4 - 10.2 mg/dL Final     Total Protein   Date Value Ref Range Status   04/01/2022 6.9 6.0 - 8.4 g/dL Final     Albumin   Date Value Ref Range Status   12/31/2022 3.7 3.5 - 5.2 g/dL Final     Total Bilirubin   Date Value Ref Range Status   04/01/2022 0.4 0.2 - 1.3 mg/dL Final     Alkaline Phosphatase   Date Value Ref Range Status   04/01/2022 69 38 - 145 U/L Final     AST   Date Value Ref Range Status   04/01/2022 20 14 - 36 U/L Final     ALT   Date Value Ref Range Status   04/01/2022 20 0 - 35 U/L Final     Anion Gap   Date Value Ref Range Status   12/31/2022 6 (L) 8 - 16 mmol/L Final     eGFR if    Date Value Ref Range Status   04/01/2022 >60 >60 mL/min/1.73 m^2 Final     eGFR if non    Date Value Ref Range Status   04/01/2022 53 (A) >60 mL/min/1.73 m^2 Final     Comment:     Calculation used to obtain the estimated glomerular filtration  rate (eGFR) is the CKD-EPI equation.             Radiology/Diagnostic Studies:          All lab results and imaging results have been reviewed and discussed with the patient    Assessment:   (1) 67 y.o. female with diagnosis of family history of ITP (sister) who has been self-referred for evaluation by medical hematology/oncology.     - check up to date CBC/Plat  - CRP, sed rate; B12/folate, vit D, and SPEP  - consider leukemia screen pending the above results       (2) Mild anemia - NCNC parameters - check iron panel, B12/folate, SPEP     (3) COPD and chronic active smoker    (4) CAD and Hypercholesterolemia    (5) CKD - followed by Dr Jose Padgett    (6) Fibromyalgia/FUAD positive/lupus - followed by Dr GILBERT Gupta in past with rheum    (7) Chronic HA's, balance issues, shakes - ? Neurological disorder - followed by neurology    VISIT DIAGNOSES:              Family history of ITP    Anemia, normocytic normochromic    Anemia, unspecified type    Anemia in chronic kidney disease, unspecified CKD stage            Plan:     PLAN:  Order cbc/plat, CMP, sed rate, CRP, B12/folate, SPEP  Proceed with evaluation and f/u with neurology  Proceed with evaluation by new rheumatologist  F/u with cardiology, dermatology, etc     RTC in  4 weeks   Fax note to Dayron Escobedo Pressor, Laura Williams    COVID-19 Discussion:    I had long discussion with patient and any applicable family about the COVID-19 coronavirus epidemic and the recommended precautions with regard to cancer and/or hematology patients. I have re-iterated the CDC recommendations for adequate hand washing, use of hand -like products, and coughing into elbow, etc. In addition, especially for our patients who are on chemotherapy and/or our otherwise immunocompromised patients, I have recommended avoidance of crowds, including movie theaters, restaurants, churches, etc. I have recommended avoidance of any sick or symptomatic family members and/or friends. I have also recommended avoidance of any raw  and unwashed food products, and general avoidance of food items that have not been prepared by themselves. The patient has been asked to call us immediately with any symptom developments, issues, questions or other general concerns.       I have explained and the patient understands all of  the current recommendation(s). I have answered all of their questions to the best of my ability and to their complete satisfaction.             Thank you for allowing me to participate in this patient's care. Please call with any questions or concerns.    Electronically signed Mo Garcia MD

## 2023-02-22 ENCOUNTER — OFFICE VISIT (OUTPATIENT)
Dept: HEMATOLOGY/ONCOLOGY | Facility: CLINIC | Age: 68
End: 2023-02-22
Payer: MEDICARE

## 2023-02-22 VITALS
BODY MASS INDEX: 30.27 KG/M2 | SYSTOLIC BLOOD PRESSURE: 115 MMHG | WEIGHT: 177.31 LBS | HEIGHT: 64 IN | HEART RATE: 95 BPM | TEMPERATURE: 98 F | DIASTOLIC BLOOD PRESSURE: 55 MMHG | RESPIRATION RATE: 18 BRPM

## 2023-02-22 DIAGNOSIS — N18.9 ANEMIA IN CHRONIC KIDNEY DISEASE, UNSPECIFIED CKD STAGE: ICD-10-CM

## 2023-02-22 DIAGNOSIS — D64.9 ANEMIA, NORMOCYTIC NORMOCHROMIC: ICD-10-CM

## 2023-02-22 DIAGNOSIS — D64.9 ANEMIA, UNSPECIFIED TYPE: ICD-10-CM

## 2023-02-22 DIAGNOSIS — D53.9 NUTRITIONAL ANEMIA, UNSPECIFIED: ICD-10-CM

## 2023-02-22 DIAGNOSIS — D63.1 ANEMIA IN CHRONIC KIDNEY DISEASE, UNSPECIFIED CKD STAGE: ICD-10-CM

## 2023-02-22 DIAGNOSIS — Z83.2 FAMILY HISTORY OF ITP: ICD-10-CM

## 2023-02-22 PROCEDURE — 99203 PR OFFICE/OUTPT VISIT, NEW, LEVL III, 30-44 MIN: ICD-10-PCS | Mod: S$GLB,,, | Performed by: INTERNAL MEDICINE

## 2023-02-22 PROCEDURE — 1159F PR MEDICATION LIST DOCUMENTED IN MEDICAL RECORD: ICD-10-PCS | Mod: CPTII,S$GLB,, | Performed by: INTERNAL MEDICINE

## 2023-02-22 PROCEDURE — 1101F PT FALLS ASSESS-DOCD LE1/YR: CPT | Mod: CPTII,S$GLB,, | Performed by: INTERNAL MEDICINE

## 2023-02-22 PROCEDURE — 99203 OFFICE O/P NEW LOW 30 MIN: CPT | Mod: S$GLB,,, | Performed by: INTERNAL MEDICINE

## 2023-02-22 PROCEDURE — 1101F PR PT FALLS ASSESS DOC 0-1 FALLS W/OUT INJ PAST YR: ICD-10-PCS | Mod: CPTII,S$GLB,, | Performed by: INTERNAL MEDICINE

## 2023-02-22 PROCEDURE — 3288F FALL RISK ASSESSMENT DOCD: CPT | Mod: CPTII,S$GLB,, | Performed by: INTERNAL MEDICINE

## 2023-02-22 PROCEDURE — 3074F PR MOST RECENT SYSTOLIC BLOOD PRESSURE < 130 MM HG: ICD-10-PCS | Mod: CPTII,S$GLB,, | Performed by: INTERNAL MEDICINE

## 2023-02-22 PROCEDURE — 3008F PR BODY MASS INDEX (BMI) DOCUMENTED: ICD-10-PCS | Mod: CPTII,S$GLB,, | Performed by: INTERNAL MEDICINE

## 2023-02-22 PROCEDURE — 1126F AMNT PAIN NOTED NONE PRSNT: CPT | Mod: CPTII,S$GLB,, | Performed by: INTERNAL MEDICINE

## 2023-02-22 PROCEDURE — 3078F PR MOST RECENT DIASTOLIC BLOOD PRESSURE < 80 MM HG: ICD-10-PCS | Mod: CPTII,S$GLB,, | Performed by: INTERNAL MEDICINE

## 2023-02-22 PROCEDURE — 1160F RVW MEDS BY RX/DR IN RCRD: CPT | Mod: CPTII,S$GLB,, | Performed by: INTERNAL MEDICINE

## 2023-02-22 PROCEDURE — 1126F PR PAIN SEVERITY QUANTIFIED, NO PAIN PRESENT: ICD-10-PCS | Mod: CPTII,S$GLB,, | Performed by: INTERNAL MEDICINE

## 2023-02-22 PROCEDURE — 3074F SYST BP LT 130 MM HG: CPT | Mod: CPTII,S$GLB,, | Performed by: INTERNAL MEDICINE

## 2023-02-22 PROCEDURE — 3078F DIAST BP <80 MM HG: CPT | Mod: CPTII,S$GLB,, | Performed by: INTERNAL MEDICINE

## 2023-02-22 PROCEDURE — 1159F MED LIST DOCD IN RCRD: CPT | Mod: CPTII,S$GLB,, | Performed by: INTERNAL MEDICINE

## 2023-02-22 PROCEDURE — 3008F BODY MASS INDEX DOCD: CPT | Mod: CPTII,S$GLB,, | Performed by: INTERNAL MEDICINE

## 2023-02-22 PROCEDURE — 1160F PR REVIEW ALL MEDS BY PRESCRIBER/CLIN PHARMACIST DOCUMENTED: ICD-10-PCS | Mod: CPTII,S$GLB,, | Performed by: INTERNAL MEDICINE

## 2023-02-22 PROCEDURE — 3288F PR FALLS RISK ASSESSMENT DOCUMENTED: ICD-10-PCS | Mod: CPTII,S$GLB,, | Performed by: INTERNAL MEDICINE

## 2023-02-28 LAB
ALBUMIN SERPL ELPH-MCNC: 3.6 G/DL (ref 3.8–4.8)
ALBUMIN SERPL-MCNC: 3.7 G/DL (ref 3.6–5.1)
ALBUMIN/GLOB SERPL: 1.2 (CALC) (ref 1–2.5)
ALP SERPL-CCNC: 66 U/L (ref 37–153)
ALPHA1 GLOB SERPL ELPH-MCNC: 0.4 G/DL (ref 0.2–0.3)
ALPHA2 GLOB SERPL ELPH-MCNC: 0.8 G/DL (ref 0.5–0.9)
ALT SERPL-CCNC: 21 U/L (ref 6–29)
AST SERPL-CCNC: 32 U/L (ref 10–35)
BASOPHILS # BLD AUTO: 22 CELLS/UL (ref 0–200)
BASOPHILS NFR BLD AUTO: 0.5 %
BETA1 GLOB SERPL ELPH-MCNC: 0.4 G/DL (ref 0.4–0.6)
BETA2 GLOB SERPL ELPH-MCNC: 0.3 G/DL (ref 0.2–0.5)
BILIRUB SERPL-MCNC: 0.3 MG/DL (ref 0.2–1.2)
BUN SERPL-MCNC: 16 MG/DL (ref 7–25)
BUN/CREAT SERPL: ABNORMAL (CALC) (ref 6–22)
CALCIUM SERPL-MCNC: 9.5 MG/DL (ref 8.6–10.4)
CHLORIDE SERPL-SCNC: 107 MMOL/L (ref 98–110)
CO2 SERPL-SCNC: 25 MMOL/L (ref 20–32)
CREAT SERPL-MCNC: 1.03 MG/DL (ref 0.5–1.05)
CRP SERPL-MCNC: 3.8 MG/L
EGFR: 59 ML/MIN/1.73M2
EOSINOPHIL # BLD AUTO: 0 CELLS/UL (ref 15–500)
EOSINOPHIL NFR BLD AUTO: 0 %
ERYTHROCYTE [DISTWIDTH] IN BLOOD BY AUTOMATED COUNT: 16.8 % (ref 11–15)
ERYTHROCYTE [SEDIMENTATION RATE] IN BLOOD BY WESTERGREN METHOD: 17 MM/H
FOLATE SERPL-MCNC: >24 NG/ML
GAMMA GLOB SERPL ELPH-MCNC: 1.1 G/DL (ref 0.8–1.7)
GLOBULIN SER CALC-MCNC: 3 G/DL (CALC) (ref 1.9–3.7)
GLUCOSE SERPL-MCNC: 96 MG/DL (ref 65–99)
HCT VFR BLD AUTO: 34.6 % (ref 35–45)
HGB BLD-MCNC: 10.6 G/DL (ref 11.7–15.5)
LYMPHOCYTES # BLD AUTO: 1518 CELLS/UL (ref 850–3900)
LYMPHOCYTES NFR BLD AUTO: 35.3 %
MCH RBC QN AUTO: 27.2 PG (ref 27–33)
MCHC RBC AUTO-ENTMCNC: 30.6 G/DL (ref 32–36)
MCV RBC AUTO: 88.9 FL (ref 80–100)
MONOCYTES # BLD AUTO: 318 CELLS/UL (ref 200–950)
MONOCYTES NFR BLD AUTO: 7.4 %
NEUTROPHILS # BLD AUTO: 2442 CELLS/UL (ref 1500–7800)
NEUTROPHILS NFR BLD AUTO: 56.8 %
PLATELET # BLD AUTO: 232 THOUSAND/UL (ref 140–400)
PMV BLD REES-ECKER: 10.2 FL (ref 7.5–12.5)
POTASSIUM SERPL-SCNC: 4.2 MMOL/L (ref 3.5–5.3)
PROT PATTERN SERPL ELPH-IMP: ABNORMAL
PROT SERPL-MCNC: 6.6 G/DL (ref 6.1–8.1)
PROT SERPL-MCNC: 6.7 G/DL (ref 6.1–8.1)
RBC # BLD AUTO: 3.89 MILLION/UL (ref 3.8–5.1)
SODIUM SERPL-SCNC: 141 MMOL/L (ref 135–146)
VIT B12 SERPL-MCNC: 346 PG/ML (ref 200–1100)
WBC # BLD AUTO: 4.3 THOUSAND/UL (ref 3.8–10.8)

## 2023-03-21 NOTE — PROGRESS NOTES
[facility] Hematology/Oncology  PROGRESS NOTE - 2nd Follow-up Visit      Subjective:       Patient ID:   NAME: Heidi Romero : 1955     67 y.o. female    Referring Doc: self-referral  Other Physicians: Gilma Escobedo (PCP); Jose Padgett (Neph); VANCE Gupta (retired); Verónica Shah (derm at [place]); Sonya (Card)           Chief Complaint: fam hx/of ITP f/u     History of Present Illness:     Patient returns today for a 2nd regularly scheduled follow-up visit.  The patient is here today to go over the results of the recently ordered labs, tests and studies. She is here with her son.    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     She had angiogram couple of weeks ago in [place] with Dr Felix Wyman which was good per patient    She has some residual fatigue, general malaise, but is better. Breathing ok with some BOYD. She is on xanax for anxiety.         ROS:   GEN: general malaise; fatigue  HEENT: normal with no HA's, sore throat, stiff neck, changes in vision  CV: normal with no CP, SOB, PND, intermittent BOYD  PULM: normal with no SOB, cough, hemoptysis, sputum or pleuritic pain  GI: normal with no abdominal pain, nausea, vomiting, constipation, diarrhea, melanotic stools, BRBPR, or hematemesis  : normal with no hematuria, dysuria  BREAST: normal with no mass, discharge, pain  SKIN: chronic rash issues but stable      Pain Scale: 0    Allergies:  Review of patient's allergies indicates:   Allergen Reactions    Vistaril [hydroxyzine hcl] Swelling    Codeine      Other reaction(s): Abdominal Pain    Hydrochlorothiazide     Triamterene        Medications:    Current Outpatient Medications:     ALPRAZolam (XANAX) 0.25 MG tablet, Take by mouth 3 (three) times daily., Disp: , Rfl:     aspirin 81 MG Chew, Take 81 mg by mouth once daily., Disp: , Rfl:     calcium-vitamin D3 (OS- + D3) 500 mg-5 mcg (200 unit) per tablet, Take 1 tablet by mouth 2  "(two) times daily with meals., Disp: , Rfl:     carboxymethylcellulose (REFRESH PLUS) 0.5 % Dpet, 1 drop 3 (three) times daily as needed., Disp: , Rfl:     cetirizine (ZYRTEC) 10 MG tablet, Take 10 mg by mouth once daily., Disp: , Rfl:     dapsone 25 MG Tab, Take 25 mg by mouth 2 (two) times daily., Disp: , Rfl:     ergocalciferol, vitamin D2, (VITAMIN D ORAL), Take by mouth., Disp: , Rfl:     FLUoxetine 20 MG capsule, Take 1 capsule (20 mg total) by mouth once daily., Disp: 30 capsule, Rfl: 11    hydrOXYzine HCL (ATARAX) 10 MG Tab, Take 10-20 mg by mouth nightly. prn, Disp: , Rfl:     ibuprofen (ADVIL,MOTRIN) 800 MG tablet, Take 800 mg by mouth every 6 (six) hours as needed for Pain., Disp: , Rfl:     levothyroxine (SYNTHROID) 100 MCG tablet, Take 25 mcg by mouth before breakfast., Disp: , Rfl:     multivitamin (THERAGRAN) per tablet, Take 1 tablet by mouth once daily. Women's centrum, Disp: , Rfl:     NURTEC 75 mg odt, SMARTSI Tablet(s) Sublingual, Disp: , Rfl:     ondansetron (ZOFRAN) 4 MG tablet, Take 4 mg by mouth every 8 (eight) hours as needed for Nausea., Disp: , Rfl:     predniSONE (DELTASONE) 10 MG tablet, Take 10 mg by mouth once daily., Disp: , Rfl:     propranoloL (INDERAL LA) 60 MG 24 hr capsule, 1 capsule., Disp: , Rfl:     rosuvastatin (CRESTOR) 20 MG tablet, Take 40 mg by mouth once daily., Disp: , Rfl:     verapamiL (CALAN) 40 MG Tab, Take 40 mg by mouth 2 (two) times daily. Only takes once a day, Disp: , Rfl:     famotidine (PEPCID) 20 MG tablet, Take 1 tablet (20 mg total) by mouth 2 (two) times daily. for 5 days, Disp: 10 tablet, Rfl: 0    PMHx/PSHx Updates:  See patient's last visit with me on 2023.  See H&P on 2023        Pathology:   Cancer Staging   No matching staging information was found for the patient.          Objective:     Vitals:  Blood pressure (!) 114/56, pulse 86, temperature 97.4 °F (36.3 °C), resp. rate 16, height 5' 4" (1.626 m), weight 81.4 kg (179 lb 8 " oz).    Physical Examination:   GEN: no apparent distress, comfortable; AAOx3  HEAD: atraumatic and normocephalic  EYES: no pallor, no icterus, PERRLA  ENT: OMM, no pharyngeal erythema, external ears WNL; no nasal discharge; no thrush  NECK: no masses, thyroid normal, trachea midline, no LAD/LN's, supple  CV: RRR with no murmur; normal pulse; normal S1 and S2; no pedal edema  CHEST: Normal respiratory effort; CTAB; normal breath sounds; no wheeze or crackles  ABDOM: nontender and nondistended; soft; normal bowel sounds; no rebound/guarding  MUSC/Skeletal: ROM normal; no crepitus; joints normal; no deformities or arthropathy  EXTREM: no clubbing, cyanosis, inflammation or swelling  SKIN: no rashes, lesions, ulcers, petechiae or subcutaneous nodules; mild residual rash on neck/anterior chest  : no king  NEURO: grossly intact; motor/sensory WNL; AAOx3; no tremors  PSYCH: normal mood, affect and behavior; a little anxious  LYMPH: normal cervical, supraclavicular, axillary and groin LN's          Labs:   Lab Results   Component Value Date    WBC 4.3 02/24/2023    HGB 10.6 (L) 02/24/2023    HCT 34.6 (L) 02/24/2023    MCV 88.9 02/24/2023     02/24/2023       CMP  Sodium   Date Value Ref Range Status   02/24/2023 141 135 - 146 mmol/L Final     Potassium   Date Value Ref Range Status   02/24/2023 4.2 3.5 - 5.3 mmol/L Final     Chloride   Date Value Ref Range Status   02/24/2023 107 98 - 110 mmol/L Final     CO2   Date Value Ref Range Status   02/24/2023 25 20 - 32 mmol/L Final     Glucose   Date Value Ref Range Status   02/24/2023 96 65 - 99 mg/dL Final     Comment:                   Fasting reference interval          BUN   Date Value Ref Range Status   02/24/2023 16 7 - 25 mg/dL Final     Creatinine   Date Value Ref Range Status   02/24/2023 1.03 0.50 - 1.05 mg/dL Final     Calcium   Date Value Ref Range Status   02/24/2023 9.5 8.6 - 10.4 mg/dL Final     Total Protein   Date Value Ref Range Status   02/24/2023  6.7 6.1 - 8.1 g/dL Final   02/24/2023 6.6 6.1 - 8.1 g/dL Final     Albumin   Date Value Ref Range Status   02/24/2023 3.7 3.6 - 5.1 g/dL Final     Total Bilirubin   Date Value Ref Range Status   02/24/2023 0.3 0.2 - 1.2 mg/dL Final     Alkaline Phosphatase   Date Value Ref Range Status   04/01/2022 69 38 - 145 U/L Final     AST   Date Value Ref Range Status   02/24/2023 32 10 - 35 U/L Final     ALT   Date Value Ref Range Status   02/24/2023 21 6 - 29 U/L Final     Anion Gap   Date Value Ref Range Status   12/31/2022 6 (L) 8 - 16 mmol/L Final     eGFR   Date Value Ref Range Status   02/24/2023 59 (L) > OR = 60 mL/min/1.73m2 Final     Comment:     The eGFR is based on the CKD-EPI 2021 equation. To calculate   the new eGFR from a previous Creatinine or Cystatin C  result, go to https://www.kidney.org/professionals/  kdoqi/gfr%5Fcalculator       Lab Results   Component Value Date    [id number] 02/24/2023     Lab Results   Component Value Date    FOLATE >24.0 02/24/2023       Protein Electrophoresis, Serum  Order: [id number]  Status: Final result     Visible to patient: No (inaccessible in Patient Portal)     Next appt: 05/11/2023 at 09:00 AM in Rheumatology (Emeka Carbajal MD)     Dx: Family history of ITP; Anemia, normoc...     0 Result Notes          Component Ref Range & Units 3 wk ago  (2/24/23) 3 wk ago  (2/24/23) 2 mo ago  (12/31/22) 11 mo ago  (4/1/22)   Total Protein 6.1 - 8.1 g/dL 6.6  6.7   6.9 R    Albumin 3.8 - 4.8 g/dL 3.6 Low   3.7 R  3.7 R  3.8 R    Alpha-1-Globulins 0.2 - 0.3 g/dL 0.4 High        Alpha-2-Globulins 0.5 - 0.9 g/dL 0.8       Beta Globulin 0.4 - 0.6 g/dL 0.4       Beta-2 Microglobulin 0.2 - 0.5 g/dL 0.3       Gamma Globulin 0.8 - 1.7 g/dL 1.1       Interpretation  Pattern consistent with an acute phase reaction                Radiology/Diagnostic Studies:    No results found.    I have reviewed all available lab results and radiology reports.    Assessment/Plan:   (1) 67 y.o. female  with  diagnosis of family history of ITP (sister) who has been self-referred for evaluation by medical hematology/oncology.      - check up to date CBC/Plat  - CRP, sed rate; B12/folate, vit D, and SPEP  - consider leukemia screen pending the above results    3/23/2023:  - her platelets are currently WNL; WBC is adequate          (2) Mild anemia - NCNC parameters - check iron panel, B12/folate, SPEP     3/23/2023:  - hgb 10.1  - mild B12 deficiency  - iron panel is still pending     (3) COPD and chronic active smoker     (4) CAD and Hypercholesterolemia     (5) CKD - followed by Dr Jose Padgett     (6) Fibromyalgia/FUAD positive/lupus - followed by Dr GILBERT Gupta in past with rheum     (7) Chronic HA's, balance issues, shakes - ? Neurological disorder - followed by neurology         VISIT DIAGNOSES:      Anemia in chronic kidney disease, unspecified CKD stage    Anemia, normocytic normochromic    Anemia, unspecified type    Family history of ITP          PLAN:  Check cbc/plat monthly; start B12 monthly and ICAR-C daily; check on outstanding results of the iron panel  F/u with neurology and vascular  Proceed with evaluation by new rheumatologist  F/u with cardiology, dermatology, etc   Refer to Allergy/Immuno     RTC in  3 months  Fax note to Dayron Escobedo Pressor, Laura Williams; Felix Wyman       Discussion:   COVID-19 Discussion:     I had long discussion with patient and any applicable family about the COVID-19 coronavirus epidemic and the recommended precautions with regard to cancer and/or hematology patients. I have re-iterated the CDC recommendations for adequate hand washing, use of hand -like products, and coughing into elbow, etc. In addition, especially for our patients who are on chemotherapy and/or our otherwise immunocompromised patients, I have recommended avoidance of crowds, including movie theaters, restaurants, churches, etc. I have recommended avoidance of any sick or symptomatic family  members and/or friends. I have also recommended avoidance of any raw and unwashed food products, and general avoidance of food items that have not been prepared by themselves. The patient has been asked to call us immediately with any symptom developments, issues, questions or other general concerns.        I spent over 25 mins of time with the patient. Reviewed results of the recently ordered labs, tests and studies; made directives with regards to the results. Over half of this time was spent couseling and coordinating care.    I have explained all of the above in detail and the patient understands all of the current recommendation(s). I have answered all of their questions to the best of my ability and to their complete satisfaction.   The patient is to continue with the current management plan.            Electronically signed by Mo Garcia MD

## 2023-03-23 ENCOUNTER — OFFICE VISIT (OUTPATIENT)
Dept: HEMATOLOGY/ONCOLOGY | Facility: CLINIC | Age: 68
End: 2023-03-23
Payer: MEDICARE

## 2023-03-23 VITALS
WEIGHT: 179.5 LBS | BODY MASS INDEX: 30.64 KG/M2 | RESPIRATION RATE: 16 BRPM | TEMPERATURE: 97 F | DIASTOLIC BLOOD PRESSURE: 56 MMHG | SYSTOLIC BLOOD PRESSURE: 114 MMHG | HEIGHT: 64 IN | HEART RATE: 86 BPM

## 2023-03-23 DIAGNOSIS — D63.1 ANEMIA IN CHRONIC KIDNEY DISEASE, UNSPECIFIED CKD STAGE: Primary | ICD-10-CM

## 2023-03-23 DIAGNOSIS — E53.8 B12 DEFICIENCY: ICD-10-CM

## 2023-03-23 DIAGNOSIS — Z83.2 FAMILY HISTORY OF ITP: ICD-10-CM

## 2023-03-23 DIAGNOSIS — N18.9 ANEMIA IN CHRONIC KIDNEY DISEASE, UNSPECIFIED CKD STAGE: Primary | ICD-10-CM

## 2023-03-23 DIAGNOSIS — D64.9 ANEMIA, UNSPECIFIED TYPE: ICD-10-CM

## 2023-03-23 DIAGNOSIS — D64.9 ANEMIA, NORMOCYTIC NORMOCHROMIC: ICD-10-CM

## 2023-03-23 PROCEDURE — 3074F PR MOST RECENT SYSTOLIC BLOOD PRESSURE < 130 MM HG: ICD-10-PCS | Mod: CPTII,S$GLB,, | Performed by: INTERNAL MEDICINE

## 2023-03-23 PROCEDURE — 1125F PR PAIN SEVERITY QUANTIFIED, PAIN PRESENT: ICD-10-PCS | Mod: CPTII,S$GLB,, | Performed by: INTERNAL MEDICINE

## 2023-03-23 PROCEDURE — 1160F RVW MEDS BY RX/DR IN RCRD: CPT | Mod: CPTII,S$GLB,, | Performed by: INTERNAL MEDICINE

## 2023-03-23 PROCEDURE — 3288F FALL RISK ASSESSMENT DOCD: CPT | Mod: CPTII,S$GLB,, | Performed by: INTERNAL MEDICINE

## 2023-03-23 PROCEDURE — 3078F DIAST BP <80 MM HG: CPT | Mod: CPTII,S$GLB,, | Performed by: INTERNAL MEDICINE

## 2023-03-23 PROCEDURE — 1159F MED LIST DOCD IN RCRD: CPT | Mod: CPTII,S$GLB,, | Performed by: INTERNAL MEDICINE

## 2023-03-23 PROCEDURE — 99215 PR OFFICE/OUTPT VISIT, EST, LEVL V, 40-54 MIN: ICD-10-PCS | Mod: S$GLB,,, | Performed by: INTERNAL MEDICINE

## 2023-03-23 PROCEDURE — 1101F PT FALLS ASSESS-DOCD LE1/YR: CPT | Mod: CPTII,S$GLB,, | Performed by: INTERNAL MEDICINE

## 2023-03-23 PROCEDURE — 3008F BODY MASS INDEX DOCD: CPT | Mod: CPTII,S$GLB,, | Performed by: INTERNAL MEDICINE

## 2023-03-23 PROCEDURE — 3078F PR MOST RECENT DIASTOLIC BLOOD PRESSURE < 80 MM HG: ICD-10-PCS | Mod: CPTII,S$GLB,, | Performed by: INTERNAL MEDICINE

## 2023-03-23 PROCEDURE — 1159F PR MEDICATION LIST DOCUMENTED IN MEDICAL RECORD: ICD-10-PCS | Mod: CPTII,S$GLB,, | Performed by: INTERNAL MEDICINE

## 2023-03-23 PROCEDURE — 1160F PR REVIEW ALL MEDS BY PRESCRIBER/CLIN PHARMACIST DOCUMENTED: ICD-10-PCS | Mod: CPTII,S$GLB,, | Performed by: INTERNAL MEDICINE

## 2023-03-23 PROCEDURE — 3074F SYST BP LT 130 MM HG: CPT | Mod: CPTII,S$GLB,, | Performed by: INTERNAL MEDICINE

## 2023-03-23 PROCEDURE — 3008F PR BODY MASS INDEX (BMI) DOCUMENTED: ICD-10-PCS | Mod: CPTII,S$GLB,, | Performed by: INTERNAL MEDICINE

## 2023-03-23 PROCEDURE — 1101F PR PT FALLS ASSESS DOC 0-1 FALLS W/OUT INJ PAST YR: ICD-10-PCS | Mod: CPTII,S$GLB,, | Performed by: INTERNAL MEDICINE

## 2023-03-23 PROCEDURE — 99215 OFFICE O/P EST HI 40 MIN: CPT | Mod: S$GLB,,, | Performed by: INTERNAL MEDICINE

## 2023-03-23 PROCEDURE — 3288F PR FALLS RISK ASSESSMENT DOCUMENTED: ICD-10-PCS | Mod: CPTII,S$GLB,, | Performed by: INTERNAL MEDICINE

## 2023-03-23 PROCEDURE — 1125F AMNT PAIN NOTED PAIN PRSNT: CPT | Mod: CPTII,S$GLB,, | Performed by: INTERNAL MEDICINE

## 2023-03-23 RX ORDER — IRON,CARBONYL/ASCORBIC ACID 100-250 MG
1 TABLET ORAL DAILY
Qty: 30 EACH | Refills: 6 | Status: SHIPPED | OUTPATIENT
Start: 2023-03-23 | End: 2023-10-27

## 2023-03-23 RX ORDER — CYANOCOBALAMIN 1000 UG/ML
1000 INJECTION, SOLUTION INTRAMUSCULAR; SUBCUTANEOUS
Qty: 6 ML | Status: SHIPPED | OUTPATIENT
Start: 2023-03-23 | End: 2023-04-04

## 2023-03-23 RX ORDER — PROPRANOLOL HYDROCHLORIDE 60 MG/1
1 CAPSULE, EXTENDED RELEASE ORAL
COMMUNITY
Start: 2023-03-21 | End: 2023-08-21 | Stop reason: SDUPTHER

## 2023-05-11 ENCOUNTER — OFFICE VISIT (OUTPATIENT)
Dept: RHEUMATOLOGY | Facility: CLINIC | Age: 68
End: 2023-05-11
Payer: MEDICARE

## 2023-05-11 VITALS
BODY MASS INDEX: 30.68 KG/M2 | DIASTOLIC BLOOD PRESSURE: 67 MMHG | WEIGHT: 179.69 LBS | SYSTOLIC BLOOD PRESSURE: 101 MMHG | HEIGHT: 64 IN | HEART RATE: 98 BPM

## 2023-05-11 DIAGNOSIS — E53.1 PYRIDOXINE DEFICIENCY: ICD-10-CM

## 2023-05-11 DIAGNOSIS — M35.0B SJOGREN SYNDROME WITH VASCULITIS: ICD-10-CM

## 2023-05-11 DIAGNOSIS — L95.8 URTICARIAL VASCULITIS: ICD-10-CM

## 2023-05-11 DIAGNOSIS — Z83.2 FAMILY HISTORY OF ITP: ICD-10-CM

## 2023-05-11 DIAGNOSIS — D89.89: Primary | ICD-10-CM

## 2023-05-11 DIAGNOSIS — R76.8 ANA POSITIVE: ICD-10-CM

## 2023-05-11 DIAGNOSIS — Z79.899 HIGH RISK MEDICATION USE: ICD-10-CM

## 2023-05-11 DIAGNOSIS — J38.3 LARYNGEAL DYSTONIA: ICD-10-CM

## 2023-05-11 DIAGNOSIS — M54.2 NECK PAIN: ICD-10-CM

## 2023-05-11 DIAGNOSIS — N18.31 CHRONIC KIDNEY DISEASE, STAGE 3A: ICD-10-CM

## 2023-05-11 DIAGNOSIS — M79.7 FIBROMYALGIA: ICD-10-CM

## 2023-05-11 DIAGNOSIS — R23.1 LIVEDO RETICULARIS: ICD-10-CM

## 2023-05-11 PROBLEM — M45.9 ANKYLOSING SPONDYLITIS OF UNSPECIFIED SITES IN SPINE: Status: ACTIVE | Noted: 2023-05-11

## 2023-05-11 PROBLEM — L98.8 AUTOIMMUNE SKIN DISEASE: Status: ACTIVE | Noted: 2023-05-11

## 2023-05-11 PROCEDURE — 1101F PT FALLS ASSESS-DOCD LE1/YR: CPT | Mod: CPTII,S$GLB,, | Performed by: INTERNAL MEDICINE

## 2023-05-11 PROCEDURE — 3288F FALL RISK ASSESSMENT DOCD: CPT | Mod: CPTII,S$GLB,, | Performed by: INTERNAL MEDICINE

## 2023-05-11 PROCEDURE — 96372 THER/PROPH/DIAG INJ SC/IM: CPT | Mod: S$GLB,,, | Performed by: INTERNAL MEDICINE

## 2023-05-11 PROCEDURE — 3008F BODY MASS INDEX DOCD: CPT | Mod: CPTII,S$GLB,, | Performed by: INTERNAL MEDICINE

## 2023-05-11 PROCEDURE — 96372 PR INJECTION,THERAP/PROPH/DIAG2ST, IM OR SUBCUT: ICD-10-PCS | Mod: S$GLB,,, | Performed by: INTERNAL MEDICINE

## 2023-05-11 PROCEDURE — 3078F PR MOST RECENT DIASTOLIC BLOOD PRESSURE < 80 MM HG: ICD-10-PCS | Mod: CPTII,S$GLB,, | Performed by: INTERNAL MEDICINE

## 2023-05-11 PROCEDURE — 99999 PR PBB SHADOW E&M-EST. PATIENT-LVL V: CPT | Mod: PBBFAC,,, | Performed by: INTERNAL MEDICINE

## 2023-05-11 PROCEDURE — 3288F PR FALLS RISK ASSESSMENT DOCUMENTED: ICD-10-PCS | Mod: CPTII,S$GLB,, | Performed by: INTERNAL MEDICINE

## 2023-05-11 PROCEDURE — 1160F RVW MEDS BY RX/DR IN RCRD: CPT | Mod: CPTII,S$GLB,, | Performed by: INTERNAL MEDICINE

## 2023-05-11 PROCEDURE — 3078F DIAST BP <80 MM HG: CPT | Mod: CPTII,S$GLB,, | Performed by: INTERNAL MEDICINE

## 2023-05-11 PROCEDURE — 3074F PR MOST RECENT SYSTOLIC BLOOD PRESSURE < 130 MM HG: ICD-10-PCS | Mod: CPTII,S$GLB,, | Performed by: INTERNAL MEDICINE

## 2023-05-11 PROCEDURE — 99205 OFFICE O/P NEW HI 60 MIN: CPT | Mod: 25,S$GLB,, | Performed by: INTERNAL MEDICINE

## 2023-05-11 PROCEDURE — 1101F PR PT FALLS ASSESS DOC 0-1 FALLS W/OUT INJ PAST YR: ICD-10-PCS | Mod: CPTII,S$GLB,, | Performed by: INTERNAL MEDICINE

## 2023-05-11 PROCEDURE — 99999 PR PBB SHADOW E&M-EST. PATIENT-LVL V: ICD-10-PCS | Mod: PBBFAC,,, | Performed by: INTERNAL MEDICINE

## 2023-05-11 PROCEDURE — 3074F SYST BP LT 130 MM HG: CPT | Mod: CPTII,S$GLB,, | Performed by: INTERNAL MEDICINE

## 2023-05-11 PROCEDURE — 1125F AMNT PAIN NOTED PAIN PRSNT: CPT | Mod: CPTII,S$GLB,, | Performed by: INTERNAL MEDICINE

## 2023-05-11 PROCEDURE — 1159F PR MEDICATION LIST DOCUMENTED IN MEDICAL RECORD: ICD-10-PCS | Mod: CPTII,S$GLB,, | Performed by: INTERNAL MEDICINE

## 2023-05-11 PROCEDURE — 3008F PR BODY MASS INDEX (BMI) DOCUMENTED: ICD-10-PCS | Mod: CPTII,S$GLB,, | Performed by: INTERNAL MEDICINE

## 2023-05-11 PROCEDURE — 1159F MED LIST DOCD IN RCRD: CPT | Mod: CPTII,S$GLB,, | Performed by: INTERNAL MEDICINE

## 2023-05-11 PROCEDURE — 99205 PR OFFICE/OUTPT VISIT, NEW, LEVL V, 60-74 MIN: ICD-10-PCS | Mod: 25,S$GLB,, | Performed by: INTERNAL MEDICINE

## 2023-05-11 PROCEDURE — 1125F PR PAIN SEVERITY QUANTIFIED, PAIN PRESENT: ICD-10-PCS | Mod: CPTII,S$GLB,, | Performed by: INTERNAL MEDICINE

## 2023-05-11 PROCEDURE — 1160F PR REVIEW ALL MEDS BY PRESCRIBER/CLIN PHARMACIST DOCUMENTED: ICD-10-PCS | Mod: CPTII,S$GLB,, | Performed by: INTERNAL MEDICINE

## 2023-05-11 RX ORDER — ASCORBIC ACID 500 MG
1000 TABLET ORAL
COMMUNITY

## 2023-05-11 RX ORDER — KETOROLAC TROMETHAMINE 30 MG/ML
60 INJECTION, SOLUTION INTRAMUSCULAR; INTRAVENOUS
Status: COMPLETED | OUTPATIENT
Start: 2023-05-11 | End: 2023-05-11

## 2023-05-11 RX ORDER — HYDROCORTISONE 25 MG/G
CREAM TOPICAL
COMMUNITY

## 2023-05-11 RX ORDER — TRIAMCINOLONE ACETONIDE 1 MG/G
CREAM TOPICAL
COMMUNITY

## 2023-05-11 RX ORDER — CYANOCOBALAMIN 1000 UG/ML
1000 INJECTION, SOLUTION INTRAMUSCULAR; SUBCUTANEOUS
Status: COMPLETED | OUTPATIENT
Start: 2023-05-11 | End: 2023-05-11

## 2023-05-11 RX ORDER — METHYLPREDNISOLONE ACETATE 80 MG/ML
80 INJECTION, SUSPENSION INTRA-ARTICULAR; INTRALESIONAL; INTRAMUSCULAR; SOFT TISSUE
Status: COMPLETED | OUTPATIENT
Start: 2023-05-11 | End: 2023-05-11

## 2023-05-11 RX ORDER — FAMOTIDINE 40 MG/1
40 TABLET, FILM COATED ORAL 2 TIMES DAILY PRN
Qty: 60 TABLET | Refills: 4 | Status: SHIPPED | OUTPATIENT
Start: 2023-05-11 | End: 2023-08-21

## 2023-05-11 RX ORDER — PILOCARPINE HYDROCHLORIDE 7.5 MG/1
7.5 TABLET, FILM COATED ORAL 3 TIMES DAILY
Qty: 90 TABLET | Refills: 11 | Status: SHIPPED | OUTPATIENT
Start: 2023-05-11 | End: 2024-05-10

## 2023-05-11 RX ORDER — CEPHRADINE 500 MG
CAPSULE ORAL
COMMUNITY

## 2023-05-11 RX ORDER — ONDANSETRON 8 MG/1
8 TABLET, ORALLY DISINTEGRATING ORAL EVERY 6 HOURS PRN
COMMUNITY

## 2023-05-11 RX ORDER — PREDNISONE 5 MG/1
10 TABLET ORAL DAILY PRN
Qty: 60 TABLET | Refills: 3 | Status: SHIPPED | OUTPATIENT
Start: 2023-05-11 | End: 2023-10-02

## 2023-05-11 RX ORDER — NEEDLES, DISPOSABLE 25GX5/8"
1 NEEDLE, DISPOSABLE MISCELLANEOUS WEEKLY
Qty: 25 EACH | Refills: 3 | Status: SHIPPED | OUTPATIENT
Start: 2023-05-11

## 2023-05-11 RX ADMIN — METHYLPREDNISOLONE ACETATE 80 MG: 80 INJECTION, SUSPENSION INTRA-ARTICULAR; INTRALESIONAL; INTRAMUSCULAR; SOFT TISSUE at 11:05

## 2023-05-11 RX ADMIN — KETOROLAC TROMETHAMINE 60 MG: 30 INJECTION, SOLUTION INTRAMUSCULAR; INTRAVENOUS at 11:05

## 2023-05-11 RX ADMIN — CYANOCOBALAMIN 1000 MCG: 1000 INJECTION, SOLUTION INTRAMUSCULAR; SUBCUTANEOUS at 11:05

## 2023-05-11 ASSESSMENT — ROUTINE ASSESSMENT OF PATIENT INDEX DATA (RAPID3)
PAIN SCORE: 4.5
PATIENT GLOBAL ASSESSMENT SCORE: 4.5
PSYCHOLOGICAL DISTRESS SCORE: 3.3
TOTAL RAPID3 SCORE: 3.67
MDHAQ FUNCTION SCORE: 0.6
FATIGUE SCORE: 1.1

## 2023-05-11 NOTE — PROGRESS NOTES
Subjective:      Patient ID: Heidi Romero is a 67 y.o. female.    Chief Complaint: Disease Management    HPI: pt is a 67  female with a history fibromyalgia and per pt she had sign of sle. She started with a rash and hives she say  and biopsy urticaria vasculitis,  her daughter has Graves and thyroiditis, she was recently restarted on thyroid meds. She also had a stent. She received J and J vaccine first then series of pfizer she felt fatigue and felt like that was the start of her symptoms. She was still severe fatigue, low energy and now has a benign tremors. She sees neurology and heme/onc she is taking iron and b12 injections. Everything started  in 2022. Derm dx  subcutaneous sle- dapsone and prednisone. She saw Dr Gupta  x 2 times. Sister  has psoriasis, granddaughter has  autoimmune hepatitis. She has hair, and breaking off and bald spots. She has a very shakey sound voice that is knew, no oral ulcers, hands swell, she has neck pain with grinding.family history of ITP (sister)  Dr Gupta saw pt generalized maculopapular eruption associated with positive FUAD, positive SSA, and positive SSB, and a skin biopsy possibly consistent with an autoimmune disorder - consider SUBACUTE CUTANEOUS LUPUS. She was started on plaquenil  and had haves , her mother had PMR  at 69 yrs old. She has a h/o PE post surgery hysterectomy. She started noting  burning hand and feet pain, she recently noted a voice change with shakey and weak cosistent with laryngeal dystonia ( my clinical impression)            She complains of pain, stiffness, joint swelling and joint warmth. Onset of symptoms was 2 years ago.The symptoms have been worsening. Affected locations include the neck, right wrist, left wrist, left foot, right foot, right ankle, left ankle and left toes. Associated symptoms include fatigue, pain at night, pain while resting, myalgias and dry eyes. Pertinent negatives include no dysuria, fever, trouble  "swallowing or headaches. She complains of morning stiffness.The neck and left wrist presents with Arthralgia.    Past treatments include corticosteroids. The treatment provided moderate relief. Factors aggravating her arthritis include activity.   Review of Systems   Constitutional:  Positive for activity change, chills and fatigue. Negative for appetite change, diaphoresis, fever and unexpected weight change.   HENT:  Positive for voice change. Negative for congestion, dental problem, ear discharge, ear pain, facial swelling, mouth sores, nosebleeds, postnasal drip, rhinorrhea, sinus pressure, sneezing, sore throat, tinnitus and trouble swallowing.    Eyes:  Negative for photophobia, pain, discharge, redness and itching.   Respiratory:  Positive for cough. Negative for apnea, chest tightness, shortness of breath and wheezing.    Cardiovascular:  Positive for leg swelling. Negative for chest pain and palpitations.   Gastrointestinal:  Positive for abdominal pain. Negative for abdominal distention, constipation, diarrhea, nausea and vomiting.   Endocrine: Negative for cold intolerance, heat intolerance, polydipsia and polyuria.   Genitourinary:  Negative for decreased urine volume, difficulty urinating, dysuria, flank pain, frequency, hematuria and urgency.   Musculoskeletal:  Positive for arthralgias, back pain, gait problem, joint swelling, myalgias, neck pain, neck stiffness and stiffness.   Skin:  Positive for rash. Negative for pallor and wound.   Allergic/Immunologic: Negative for immunocompromised state.   Neurological:  Positive for speech difficulty. Negative for dizziness, tremors, numbness and headaches.   Hematological:  Negative for adenopathy. Does not bruise/bleed easily.   Psychiatric/Behavioral:  Negative for sleep disturbance. The patient is not nervous/anxious.       Objective:   /67   Pulse 98   Ht 5' 4.02" (1.626 m)   Wt 81.5 kg (179 lb 10.8 oz)   BMI 30.83 kg/m²   Physical Exam "   Constitutional: She is oriented to person, place, and time. No distress.   HENT:   Head: Normocephalic and atraumatic.   Eyes: Pupils are equal, round, and reactive to light. Right eye exhibits no discharge. Left eye exhibits no discharge.   Neck: No thyromegaly present.   Cardiovascular: Normal rate, regular rhythm and normal heart sounds. Exam reveals no gallop and no friction rub.   No murmur heard.  Pulmonary/Chest: Breath sounds normal. She has no wheezes. She has no rales. She exhibits no tenderness.   Abdominal: There is no abdominal tenderness. There is no rebound and no guarding.   Musculoskeletal:         General: Tenderness present.      Right shoulder: Tenderness present.      Left shoulder: Tenderness present.      Right elbow: Normal.      Left elbow: Tenderness present.      Right wrist: Tenderness present.      Left wrist: Tenderness present.      Cervical back: Neck supple.      Right knee: Effusion present. Tenderness present.      Left knee: Effusion present. Tenderness present.   Lymphadenopathy:     She has no cervical adenopathy.   Neurological: She is alert and oriented to person, place, and time. Gait normal.   Skin: Skin is dry. No rash noted. No erythema. There is pallor.   Psychiatric: Mood and affect normal.   Vitals reviewed.      Right Side Rheumatological Exam     Examination finds the elbow normal.    The patient is tender to palpation of the shoulder, wrist, knee, 1st PIP, 1st MCP, 2nd PIP, 2nd MCP, 3rd PIP, 3rd MCP, 4th PIP, 4th MCP, 5th PIP and 5th MCP    She has swelling of the 1st PIP, 1st MCP, 2nd PIP, 2nd MCP, 3rd PIP, 3rd MCP, 4th PIP, 4th MCP, 5th PIP and 5th MCP    Shoulder Exam   Tenderness Location: no tenderness    Range of Motion   Active abduction:  abnormal   Adduction: abnormal  Sensation: normal    Knee Exam   Patellofemoral Crepitus: positive  Effusion: positive  Sensation: normal    Hip Exam   Tenderness Location: posterior  Sensation: normal    Elbow/Wrist Exam    Tenderness Location: no tenderness  Sensation: normal    Left Side Rheumatological Exam     The patient is tender to palpation of the shoulder, elbow, wrist, knee, 1st PIP, 1st MCP, 2nd PIP, 2nd MCP, 3rd PIP, 3rd MCP, 4th PIP, 4th MCP, 5th PIP and 5th MCP.    She has swelling of the 1st PIP, 1st MCP, 2nd PIP, 2nd MCP, 3rd PIP, 3rd MCP, 4th PIP, 4th MCP, 5th PIP and 5th MCP    Shoulder Exam   Tenderness Location: no tenderness    Range of Motion   Active abduction:  abnormal   Sensation: normal    Knee Exam     Patellofemoral Crepitus: positive  Effusion: positive  Sensation: normal    Hip Exam   Tenderness Location: posterior  Sensation: normal    Elbow/Wrist Exam   Sensation: normal      Back/Neck Exam   General Inspection   Gait: normal         No data to display     Collected Updated Procedure    08/08/2022 1452 08/11/2022 0716 FUAD, MPX w/rflx to 11 Abs [[id number]]   (Abnormal)   Blood    Component Value Units   Anti-SSA Antibody >8.0 High  AI   Chromatin Antibody <0.2 AI   Anti-SSB Antibody 6.0 High  AI   Ribosomal P Ab <0.2 AI   RNP Antibody 0.6 AI   Steinberg AB <0.2 AI   Steinberg/RNP Antibodies <0.2 AI   Scleroderma SCL- <0.2 AI   ds DNA Ab 1  IU/mL   Leida-1 Autoantibodies <0.2 AI   FUAD PLUS PANEL SEE BELOW: Comment     Anti-Centromere Antibody <0.2 AI          08/08/2022 1451 08/12/2022 0717 Lupus Anticoagulant Eval w/reflex [[id number]]   Blood    Component Value Units   PTT LA 26.9 sec   Dilute Viper Venom Time 37.6 sec   Lupus Reflex Interpretation Comment:            08/08/2022 1452 08/08/2022 1514 C-Reactive Protein [[id number]]   Blood    Component Value Units   CRP 0.25 mg/dL          08/08/2022 1451 08/11/2022 0716 Cyclic Citrullinated Peptide Antibody, IgG [[id number]]   Blood    Component Value Units   CCP Antibodies 8  units          08/08/2022 1451 08/10/2022 0716 Rheumatoid Factor [[id number]]   Blood    Component Value Units   Rheumatoid Factor <10.0  IU/mL          08/08/2022 1452 08/18/2022 1512 HLA B27  Antigen [[id number]]   Blood    Component Value   HLA B27 Negative           08/08/2022 1451 08/08/2022 1514 Sedimentation rate [[id number]]   Blood    Component Value Units   Sed Rate 8 mm/Hr          08/08/2022 1451 08/10/2022 1011 Thyroid Peroxidase and Thyroglobulin Ab [[id number]]   Blood    Component Value Units   Thyroperoxidase Antibodies 8 IU/mL   Thyroglobulin <1.0  IU/mL          08/08/2022 1451 08/14/2022 0708 Cardiolipin antibody [[id number]]   Blood    Component Value Units   Anticardiolipin IgG <9  GPL U/mL   Anticardiolipin IgA <9  APL U/mL   Anticardiolipin IgM <9  MPL U/mL            02/24/2023 1358 02/28/2023 0730 Sedimentation Rate [[id number]] Component Value Units   Sed Rate 17 mm/h          02/24/2023 1358 02/28/2023 0730 C-Reactive Protein [[id number]]   Blood    Component Value Units   CRP 3.8 mg/L          02/24/2023 1358 02/28/2023 0730 Protein Electrophoresis, Serum [[id number]]   (Abnormal)   Blood    Component Value Units   Total Protein 6.6 g/dL   Albumin 3.6 Low  g/dL   Alpha-1-Globulins 0.4 High  g/dL   Alpha-2-Globulins 0.8 g/dL   Beta Globulin 0.4 g/dL   Beta-2 Microglobulin 0.3 g/dL   Gamma Globulin 1.1 g/dL   Interpretation Pattern consistent with an acute phase reaction           02/24/2023 1358 02/28/2023 0730 Folate [[id number]]   Blood    Component Value Units   Folate >24.0  ng/mL          02/24/2023 1358 02/28/2023 0730 B-12 [[id number]]   Blood    Component Value Units   Vitamin B-12 346  pg/mL            Assessment:     1. Autoimmune skin disease    2. Chronic kidney disease, stage 3a    3. Fibromyalgia    4. Family history of ITP    5. High risk medication use    6. FUAD positive    7. Sjogren syndrome with vasculitis    8. Laryngeal dystonia    9. 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    Diagnoses and all orders for this visit:    Autoimmune skin disease  -      FUAD Screen w/Reflex; Future  -     Anti Sm/RNP Antibody; Future  -     Anti-DNA Ab, Double-Stranded; Future  -     Anti-Histone Antibody; Future  -     Anti-Scleroderma Antibody; Future  -     Sjogrens syndrome-A extractable nuclear antibody; Future  -     Sjogrens syndrome-B extractable nuclear antibody; Future  -     Sedimentation rate; Future  -     Rheumatoid Factor; Future  -     C-Reactive Protein; Future  -     Myeloperoxidase Antibody (MPO); Future  -     GLOMERULAR BASEMENT MEMBRANE ANTIBODIES; Future  -     famotidine (PEPCID) 40 MG tablet; Take 1 tablet (40 mg total) by mouth 2 (two) times daily as needed for Heartburn.  -     ANTI-NEUTROPHILIC CYTOPLASMIC ANTIBODY; Future  -     Anti-parietal antibody; Future  -     C3 Complement; Future  -     C4 Complement; Future  -     Complement, Total; Future  -     Ribosomal P Antibody, SHERI; Future  -     Vitamin D; Future  -     Vitamin B6; Future  -     Vitamin B1; Future  -     Thiopurine Methyltrans (TPMT) Genotyping; Future  -     X-Ray Cervical Spine AP And Lateral; Future  -     X-Ray Lumbar Spine Ap And Lateral; Future  -     X-Ray Thoracic Spine AP Lateral; Future  -     FUAD Screen w/Reflex  -     Anti Sm/RNP Antibody  -     Anti-DNA Ab, Double-Stranded  -     Anti-Histone Antibody  -     Anti-Scleroderma Antibody  -     Sjogrens syndrome-A extractable nuclear antibody  -     Sjogrens syndrome-B extractable nuclear antibody  -     Sedimentation rate  -     Rheumatoid Factor  -     C-Reactive Protein  -     Myeloperoxidase Antibody (MPO)  -     GLOMERULAR BASEMENT MEMBRANE ANTIBODIES  -     ANTI-NEUTROPHILIC CYTOPLASMIC ANTIBODY  -     Anti-parietal antibody  -     C3 Complement  -     C4 Complement  -     Complement, Total  -     Ribosomal P Antibody, SHERI  -     Vitamin D  -     Vitamin B6  -     Vitamin B1  -     Thiopurine Methyltrans (TPMT) Genotyping  -     pilocarpine HCl (SALAGEN) 7.5 mg tablet; Take 1 tablet (7.5 mg total) by mouth 3 (three)  "times daily.  -     predniSONE (DELTASONE) 5 MG tablet; Take 2 tablets (10 mg total) by mouth daily as needed (flare).  -     methylPREDNISolone acetate injection 80 mg  -     cyanocobalamin injection 1,000 mcg  -     needle, disp, 30 gauge 30 gauge x 1/2" Ndle; 1 each by Misc.(Non-Drug; Combo Route) route once a week.  -     blunt needle, disposable 18 x 1 1/2 " Ndle; 1 Syringe by Misc.(Non-Drug; Combo Route) route once a week.  -     ketorolac injection 60 mg  -     folic acid-vit B6-vit B12 2.5-25-2 mg (FOLBIC OR EQUIV) 2.5-25-2 mg Tab; Take 1 tablet by mouth once daily.    Chronic kidney disease, stage 3a  -     FUAD Screen w/Reflex; Future  -     Anti Sm/RNP Antibody; Future  -     Anti-DNA Ab, Double-Stranded; Future  -     Anti-Histone Antibody; Future  -     Anti-Scleroderma Antibody; Future  -     Sjogrens syndrome-A extractable nuclear antibody; Future  -     Sjogrens syndrome-B extractable nuclear antibody; Future  -     Sedimentation rate; Future  -     Rheumatoid Factor; Future  -     C-Reactive Protein; Future  -     Myeloperoxidase Antibody (MPO); Future  -     GLOMERULAR BASEMENT MEMBRANE ANTIBODIES; Future  -     famotidine (PEPCID) 40 MG tablet; Take 1 tablet (40 mg total) by mouth 2 (two) times daily as needed for Heartburn.  -     ANTI-NEUTROPHILIC CYTOPLASMIC ANTIBODY; Future  -     Anti-parietal antibody; Future  -     C3 Complement; Future  -     C4 Complement; Future  -     Complement, Total; Future  -     Ribosomal P Antibody, SHERI; Future  -     Vitamin D; Future  -     Vitamin B6; Future  -     Vitamin B1; Future  -     Thiopurine Methyltrans (TPMT) Genotyping; Future  -     X-Ray Cervical Spine AP And Lateral; Future  -     X-Ray Lumbar Spine Ap And Lateral; Future  -     X-Ray Thoracic Spine AP Lateral; Future  -     FUAD Screen w/Reflex  -     Anti Sm/RNP Antibody  -     Anti-DNA Ab, Double-Stranded  -     Anti-Histone Antibody  -     Anti-Scleroderma Antibody  -     Sjogrens syndrome-A " extractable nuclear antibody  -     Sjogrens syndrome-B extractable nuclear antibody  -     Sedimentation rate  -     Rheumatoid Factor  -     C-Reactive Protein  -     Myeloperoxidase Antibody (MPO)  -     GLOMERULAR BASEMENT MEMBRANE ANTIBODIES  -     ANTI-NEUTROPHILIC CYTOPLASMIC ANTIBODY  -     Anti-parietal antibody  -     C3 Complement  -     C4 Complement  -     Complement, Total  -     Ribosomal P Antibody, SHERI  -     Vitamin D  -     Vitamin B6  -     Vitamin B1  -     Thiopurine Methyltrans (TPMT) Genotyping    Fibromyalgia  -     FUAD Screen w/Reflex; Future  -     Anti Sm/RNP Antibody; Future  -     Anti-DNA Ab, Double-Stranded; Future  -     Anti-Histone Antibody; Future  -     Anti-Scleroderma Antibody; Future  -     Sjogrens syndrome-A extractable nuclear antibody; Future  -     Sjogrens syndrome-B extractable nuclear antibody; Future  -     Sedimentation rate; Future  -     Rheumatoid Factor; Future  -     C-Reactive Protein; Future  -     Myeloperoxidase Antibody (MPO); Future  -     GLOMERULAR BASEMENT MEMBRANE ANTIBODIES; Future  -     famotidine (PEPCID) 40 MG tablet; Take 1 tablet (40 mg total) by mouth 2 (two) times daily as needed for Heartburn.  -     ANTI-NEUTROPHILIC CYTOPLASMIC ANTIBODY; Future  -     Anti-parietal antibody; Future  -     C3 Complement; Future  -     C4 Complement; Future  -     Complement, Total; Future  -     Ribosomal P Antibody, SHERI; Future  -     Vitamin D; Future  -     Vitamin B6; Future  -     Vitamin B1; Future  -     Thiopurine Methyltrans (TPMT) Genotyping; Future  -     X-Ray Cervical Spine AP And Lateral; Future  -     X-Ray Lumbar Spine Ap And Lateral; Future  -     X-Ray Thoracic Spine AP Lateral; Future  -     FUAD Screen w/Reflex  -     Anti Sm/RNP Antibody  -     Anti-DNA Ab, Double-Stranded  -     Anti-Histone Antibody  -     Anti-Scleroderma Antibody  -     Sjogrens syndrome-A extractable nuclear antibody  -     Sjogrens syndrome-B extractable nuclear  "antibody  -     Sedimentation rate  -     Rheumatoid Factor  -     C-Reactive Protein  -     Myeloperoxidase Antibody (MPO)  -     GLOMERULAR BASEMENT MEMBRANE ANTIBODIES  -     ANTI-NEUTROPHILIC CYTOPLASMIC ANTIBODY  -     Anti-parietal antibody  -     C3 Complement  -     C4 Complement  -     Complement, Total  -     Ribosomal P Antibody, SHERI  -     Vitamin D  -     Vitamin B6  -     Vitamin B1  -     Thiopurine Methyltrans (TPMT) Genotyping  -     pilocarpine HCl (SALAGEN) 7.5 mg tablet; Take 1 tablet (7.5 mg total) by mouth 3 (three) times daily.  -     predniSONE (DELTASONE) 5 MG tablet; Take 2 tablets (10 mg total) by mouth daily as needed (flare).  -     methylPREDNISolone acetate injection 80 mg  -     cyanocobalamin injection 1,000 mcg  -     needle, disp, 30 gauge 30 gauge x 1/2" Ndle; 1 each by Misc.(Non-Drug; Combo Route) route once a week.  -     blunt needle, disposable 18 x 1 1/2 " Ndle; 1 Syringe by Misc.(Non-Drug; Combo Route) route once a week.  -     ketorolac injection 60 mg  -     folic acid-vit B6-vit B12 2.5-25-2 mg (FOLBIC OR EQUIV) 2.5-25-2 mg Tab; Take 1 tablet by mouth once daily.    Family history of ITP  -     FUAD Screen w/Reflex; Future  -     Anti Sm/RNP Antibody; Future  -     Anti-DNA Ab, Double-Stranded; Future  -     Anti-Histone Antibody; Future  -     Anti-Scleroderma Antibody; Future  -     Sjogrens syndrome-A extractable nuclear antibody; Future  -     Sjogrens syndrome-B extractable nuclear antibody; Future  -     Sedimentation rate; Future  -     Rheumatoid Factor; Future  -     C-Reactive Protein; Future  -     Myeloperoxidase Antibody (MPO); Future  -     GLOMERULAR BASEMENT MEMBRANE ANTIBODIES; Future  -     famotidine (PEPCID) 40 MG tablet; Take 1 tablet (40 mg total) by mouth 2 (two) times daily as needed for Heartburn.  -     ANTI-NEUTROPHILIC CYTOPLASMIC ANTIBODY; Future  -     Anti-parietal antibody; Future  -     C3 Complement; Future  -     C4 Complement; " "Future  -     Complement, Total; Future  -     Ribosomal P Antibody, SHERI; Future  -     Vitamin D; Future  -     Vitamin B6; Future  -     Vitamin B1; Future  -     FUAD Screen w/Reflex  -     Anti Sm/RNP Antibody  -     Anti-DNA Ab, Double-Stranded  -     Anti-Histone Antibody  -     Anti-Scleroderma Antibody  -     Sjogrens syndrome-A extractable nuclear antibody  -     Sjogrens syndrome-B extractable nuclear antibody  -     Sedimentation rate  -     Rheumatoid Factor  -     C-Reactive Protein  -     Myeloperoxidase Antibody (MPO)  -     GLOMERULAR BASEMENT MEMBRANE ANTIBODIES  -     ANTI-NEUTROPHILIC CYTOPLASMIC ANTIBODY  -     Anti-parietal antibody  -     C3 Complement  -     C4 Complement  -     Complement, Total  -     Ribosomal P Antibody, SHERI  -     Vitamin D  -     Vitamin B6  -     Vitamin B1  -     pilocarpine HCl (SALAGEN) 7.5 mg tablet; Take 1 tablet (7.5 mg total) by mouth 3 (three) times daily.  -     predniSONE (DELTASONE) 5 MG tablet; Take 2 tablets (10 mg total) by mouth daily as needed (flare).  -     methylPREDNISolone acetate injection 80 mg  -     cyanocobalamin injection 1,000 mcg  -     needle, disp, 30 gauge 30 gauge x 1/2" Ndle; 1 each by Misc.(Non-Drug; Combo Route) route once a week.  -     blunt needle, disposable 18 x 1 1/2 " Ndle; 1 Syringe by Misc.(Non-Drug; Combo Route) route once a week.  -     ketorolac injection 60 mg  -     folic acid-vit B6-vit B12 2.5-25-2 mg (FOLBIC OR EQUIV) 2.5-25-2 mg Tab; Take 1 tablet by mouth once daily.    High risk medication use  -     FUAD Screen w/Reflex; Future  -     Anti Sm/RNP Antibody; Future  -     Anti-DNA Ab, Double-Stranded; Future  -     Anti-Histone Antibody; Future  -     Anti-Scleroderma Antibody; Future  -     Sjogrens syndrome-A extractable nuclear antibody; Future  -     Sjogrens syndrome-B extractable nuclear antibody; Future  -     Sedimentation rate; Future  -     Rheumatoid Factor; Future  -     C-Reactive Protein; Future  -   "   Myeloperoxidase Antibody (MPO); Future  -     GLOMERULAR BASEMENT MEMBRANE ANTIBODIES; Future  -     famotidine (PEPCID) 40 MG tablet; Take 1 tablet (40 mg total) by mouth 2 (two) times daily as needed for Heartburn.  -     ANTI-NEUTROPHILIC CYTOPLASMIC ANTIBODY; Future  -     Anti-parietal antibody; Future  -     C3 Complement; Future  -     C4 Complement; Future  -     Complement, Total; Future  -     Ribosomal P Antibody, SHERI; Future  -     Vitamin D; Future  -     Vitamin B6; Future  -     Vitamin B1; Future  -     FUAD Screen w/Reflex  -     Anti Sm/RNP Antibody  -     Anti-DNA Ab, Double-Stranded  -     Anti-Histone Antibody  -     Anti-Scleroderma Antibody  -     Sjogrens syndrome-A extractable nuclear antibody  -     Sjogrens syndrome-B extractable nuclear antibody  -     Sedimentation rate  -     Rheumatoid Factor  -     C-Reactive Protein  -     Myeloperoxidase Antibody (MPO)  -     GLOMERULAR BASEMENT MEMBRANE ANTIBODIES  -     ANTI-NEUTROPHILIC CYTOPLASMIC ANTIBODY  -     Anti-parietal antibody  -     C3 Complement  -     C4 Complement  -     Complement, Total  -     Ribosomal P Antibody, SHERI  -     Vitamin D  -     Vitamin B6  -     Vitamin B1    FUAD positive  -     FUAD Screen w/Reflex; Future  -     Anti Sm/RNP Antibody; Future  -     Anti-DNA Ab, Double-Stranded; Future  -     Anti-Histone Antibody; Future  -     Anti-Scleroderma Antibody; Future  -     Sjogrens syndrome-A extractable nuclear antibody; Future  -     Sjogrens syndrome-B extractable nuclear antibody; Future  -     Sedimentation rate; Future  -     Rheumatoid Factor; Future  -     C-Reactive Protein; Future  -     Myeloperoxidase Antibody (MPO); Future  -     GLOMERULAR BASEMENT MEMBRANE ANTIBODIES; Future  -     famotidine (PEPCID) 40 MG tablet; Take 1 tablet (40 mg total) by mouth 2 (two) times daily as needed for Heartburn.  -     ANTI-NEUTROPHILIC CYTOPLASMIC ANTIBODY; Future  -     Anti-parietal antibody; Future  -     C3  "Complement; Future  -     C4 Complement; Future  -     Complement, Total; Future  -     Ribosomal P Antibody, SHERI; Future  -     Vitamin D; Future  -     Vitamin B6; Future  -     Vitamin B1; Future  -     FUAD Screen w/Reflex  -     Anti Sm/RNP Antibody  -     Anti-DNA Ab, Double-Stranded  -     Anti-Histone Antibody  -     Anti-Scleroderma Antibody  -     Sjogrens syndrome-A extractable nuclear antibody  -     Sjogrens syndrome-B extractable nuclear antibody  -     Sedimentation rate  -     Rheumatoid Factor  -     C-Reactive Protein  -     Myeloperoxidase Antibody (MPO)  -     GLOMERULAR BASEMENT MEMBRANE ANTIBODIES  -     ANTI-NEUTROPHILIC CYTOPLASMIC ANTIBODY  -     Anti-parietal antibody  -     C3 Complement  -     C4 Complement  -     Complement, Total  -     Ribosomal P Antibody, SHERI  -     Vitamin D  -     Vitamin B6  -     Vitamin B1  -     pilocarpine HCl (SALAGEN) 7.5 mg tablet; Take 1 tablet (7.5 mg total) by mouth 3 (three) times daily.  -     predniSONE (DELTASONE) 5 MG tablet; Take 2 tablets (10 mg total) by mouth daily as needed (flare).  -     methylPREDNISolone acetate injection 80 mg  -     cyanocobalamin injection 1,000 mcg  -     needle, disp, 30 gauge 30 gauge x 1/2" Ndle; 1 each by Misc.(Non-Drug; Combo Route) route once a week.  -     blunt needle, disposable 18 x 1 1/2 " Ndle; 1 Syringe by Misc.(Non-Drug; Combo Route) route once a week.  -     ketorolac injection 60 mg  -     folic acid-vit B6-vit B12 2.5-25-2 mg (FOLBIC OR EQUIV) 2.5-25-2 mg Tab; Take 1 tablet by mouth once daily.    Sjogren syndrome with vasculitis  -     FUAD Screen w/Reflex; Future  -     Anti Sm/RNP Antibody; Future  -     Anti-DNA Ab, Double-Stranded; Future  -     Anti-Histone Antibody; Future  -     Anti-Scleroderma Antibody; Future  -     Sjogrens syndrome-A extractable nuclear antibody; Future  -     Sjogrens syndrome-B extractable nuclear antibody; Future  -     Sedimentation rate; Future  -     Rheumatoid " "Factor; Future  -     C-Reactive Protein; Future  -     Myeloperoxidase Antibody (MPO); Future  -     GLOMERULAR BASEMENT MEMBRANE ANTIBODIES; Future  -     famotidine (PEPCID) 40 MG tablet; Take 1 tablet (40 mg total) by mouth 2 (two) times daily as needed for Heartburn.  -     ANTI-NEUTROPHILIC CYTOPLASMIC ANTIBODY; Future  -     Anti-parietal antibody; Future  -     C3 Complement; Future  -     C4 Complement; Future  -     Complement, Total; Future  -     Ribosomal P Antibody, SHERI; Future  -     Vitamin D; Future  -     Vitamin B6; Future  -     Vitamin B1; Future  -     FUAD Screen w/Reflex  -     Anti Sm/RNP Antibody  -     Anti-DNA Ab, Double-Stranded  -     Anti-Histone Antibody  -     Anti-Scleroderma Antibody  -     Sjogrens syndrome-A extractable nuclear antibody  -     Sjogrens syndrome-B extractable nuclear antibody  -     Sedimentation rate  -     Rheumatoid Factor  -     C-Reactive Protein  -     Myeloperoxidase Antibody (MPO)  -     GLOMERULAR BASEMENT MEMBRANE ANTIBODIES  -     ANTI-NEUTROPHILIC CYTOPLASMIC ANTIBODY  -     Anti-parietal antibody  -     C3 Complement  -     C4 Complement  -     Complement, Total  -     Ribosomal P Antibody, SHERI  -     Vitamin D  -     Vitamin B6  -     Vitamin B1  -     pilocarpine HCl (SALAGEN) 7.5 mg tablet; Take 1 tablet (7.5 mg total) by mouth 3 (three) times daily.  -     predniSONE (DELTASONE) 5 MG tablet; Take 2 tablets (10 mg total) by mouth daily as needed (flare).  -     methylPREDNISolone acetate injection 80 mg  -     cyanocobalamin injection 1,000 mcg  -     needle, disp, 30 gauge 30 gauge x 1/2" Ndle; 1 each by Misc.(Non-Drug; Combo Route) route once a week.  -     blunt needle, disposable 18 x 1 1/2 " Ndle; 1 Syringe by Misc.(Non-Drug; Combo Route) route once a week.  -     ketorolac injection 60 mg  -     folic acid-vit B6-vit B12 2.5-25-2 mg (FOLBIC OR EQUIV) 2.5-25-2 mg Tab; Take 1 tablet by mouth once daily.    Laryngeal " dystonia  Comments:  clinical dx  Orders:  -     FUAD Screen w/Reflex; Future  -     Anti Sm/RNP Antibody; Future  -     Anti-DNA Ab, Double-Stranded; Future  -     Anti-Histone Antibody; Future  -     Anti-Scleroderma Antibody; Future  -     Sjogrens syndrome-A extractable nuclear antibody; Future  -     Sjogrens syndrome-B extractable nuclear antibody; Future  -     Sedimentation rate; Future  -     Rheumatoid Factor; Future  -     C-Reactive Protein; Future  -     Myeloperoxidase Antibody (MPO); Future  -     GLOMERULAR BASEMENT MEMBRANE ANTIBODIES; Future  -     famotidine (PEPCID) 40 MG tablet; Take 1 tablet (40 mg total) by mouth 2 (two) times daily as needed for Heartburn.  -     ANTI-NEUTROPHILIC CYTOPLASMIC ANTIBODY; Future  -     Anti-parietal antibody; Future  -     C3 Complement; Future  -     C4 Complement; Future  -     Complement, Total; Future  -     Ribosomal P Antibody, SHERI; Future  -     Vitamin D; Future  -     Vitamin B6; Future  -     Vitamin B1; Future  -     FUAD Screen w/Reflex  -     Anti Sm/RNP Antibody  -     Anti-DNA Ab, Double-Stranded  -     Anti-Histone Antibody  -     Anti-Scleroderma Antibody  -     Sjogrens syndrome-A extractable nuclear antibody  -     Sjogrens syndrome-B extractable nuclear antibody  -     Sedimentation rate  -     Rheumatoid Factor  -     C-Reactive Protein  -     Myeloperoxidase Antibody (MPO)  -     GLOMERULAR BASEMENT MEMBRANE ANTIBODIES  -     ANTI-NEUTROPHILIC CYTOPLASMIC ANTIBODY  -     Anti-parietal antibody  -     C3 Complement  -     C4 Complement  -     Complement, Total  -     Ribosomal P Antibody, SHERI  -     Vitamin D  -     Vitamin B6  -     Vitamin B1  -     pilocarpine HCl (SALAGEN) 7.5 mg tablet; Take 1 tablet (7.5 mg total) by mouth 3 (three) times daily.  -     predniSONE (DELTASONE) 5 MG tablet; Take 2 tablets (10 mg total) by mouth daily as needed (flare).  -     methylPREDNISolone acetate injection 80 mg  -     cyanocobalamin injection  "1,000 mcg  -     needle, disp, 30 gauge 30 gauge x 1/2" Ndle; 1 each by Misc.(Non-Drug; Combo Route) route once a week.  -     blunt needle, disposable 18 x 1 1/2 " Ndle; 1 Syringe by Misc.(Non-Drug; Combo Route) route once a week.  -     ketorolac injection 60 mg  -     folic acid-vit B6-vit B12 2.5-25-2 mg (FOLBIC OR EQUIV) 2.5-25-2 mg Tab; Take 1 tablet by mouth once daily.    Neck pain  -     FUAD Screen w/Reflex; Future  -     Anti Sm/RNP Antibody; Future  -     Anti-DNA Ab, Double-Stranded; Future  -     Anti-Histone Antibody; Future  -     Anti-Scleroderma Antibody; Future  -     Sjogrens syndrome-A extractable nuclear antibody; Future  -     Sjogrens syndrome-B extractable nuclear antibody; Future  -     Sedimentation rate; Future  -     Rheumatoid Factor; Future  -     C-Reactive Protein; Future  -     Myeloperoxidase Antibody (MPO); Future  -     GLOMERULAR BASEMENT MEMBRANE ANTIBODIES; Future  -     famotidine (PEPCID) 40 MG tablet; Take 1 tablet (40 mg total) by mouth 2 (two) times daily as needed for Heartburn.  -     ANTI-NEUTROPHILIC CYTOPLASMIC ANTIBODY; Future  -     Anti-parietal antibody; Future  -     C3 Complement; Future  -     C4 Complement; Future  -     Complement, Total; Future  -     Ribosomal P Antibody, SHERI; Future  -     Vitamin D; Future  -     Vitamin B6; Future  -     Vitamin B1; Future  -     Thiopurine Methyltrans (TPMT) Genotyping; Future  -     X-Ray Cervical Spine AP And Lateral; Future  -     X-Ray Lumbar Spine Ap And Lateral; Future  -     X-Ray Thoracic Spine AP Lateral; Future  -     FUAD Screen w/Reflex  -     Anti Sm/RNP Antibody  -     Anti-DNA Ab, Double-Stranded  -     Anti-Histone Antibody  -     Anti-Scleroderma Antibody  -     Sjogrens syndrome-A extractable nuclear antibody  -     Sjogrens syndrome-B extractable nuclear antibody  -     Sedimentation rate  -     Rheumatoid Factor  -     C-Reactive Protein  -     Myeloperoxidase Antibody (MPO)  -     GLOMERULAR " "BASEMENT MEMBRANE ANTIBODIES  -     ANTI-NEUTROPHILIC CYTOPLASMIC ANTIBODY  -     Anti-parietal antibody  -     C3 Complement  -     C4 Complement  -     Complement, Total  -     Ribosomal P Antibody, SHERI  -     Vitamin D  -     Vitamin B6  -     Vitamin B1  -     Thiopurine Methyltrans (TPMT) Genotyping  -     pilocarpine HCl (SALAGEN) 7.5 mg tablet; Take 1 tablet (7.5 mg total) by mouth 3 (three) times daily.  -     predniSONE (DELTASONE) 5 MG tablet; Take 2 tablets (10 mg total) by mouth daily as needed (flare).  -     methylPREDNISolone acetate injection 80 mg  -     cyanocobalamin injection 1,000 mcg  -     needle, disp, 30 gauge 30 gauge x 1/2" Ndle; 1 each by Misc.(Non-Drug; Combo Route) route once a week.  -     blunt needle, disposable 18 x 1 1/2 " Ndle; 1 Syringe by Misc.(Non-Drug; Combo Route) route once a week.  -     ketorolac injection 60 mg  -     folic acid-vit B6-vit B12 2.5-25-2 mg (FOLBIC OR EQUIV) 2.5-25-2 mg Tab; Take 1 tablet by mouth once daily.    Urticarial vasculitis  -     FUAD Screen w/Reflex; Future  -     Anti Sm/RNP Antibody; Future  -     Anti-DNA Ab, Double-Stranded; Future  -     Anti-Histone Antibody; Future  -     Anti-Scleroderma Antibody; Future  -     Sjogrens syndrome-A extractable nuclear antibody; Future  -     Sjogrens syndrome-B extractable nuclear antibody; Future  -     Sedimentation rate; Future  -     Rheumatoid Factor; Future  -     C-Reactive Protein; Future  -     Myeloperoxidase Antibody (MPO); Future  -     GLOMERULAR BASEMENT MEMBRANE ANTIBODIES; Future  -     famotidine (PEPCID) 40 MG tablet; Take 1 tablet (40 mg total) by mouth 2 (two) times daily as needed for Heartburn.  -     ANTI-NEUTROPHILIC CYTOPLASMIC ANTIBODY; Future  -     Anti-parietal antibody; Future  -     C3 Complement; Future  -     C4 Complement; Future  -     Complement, Total; Future  -     Ribosomal P Antibody, SHERI; Future  -     Vitamin D; Future  -     Vitamin B6; Future  -     Vitamin " "B1; Future  -     FUAD Screen w/Reflex  -     Anti Sm/RNP Antibody  -     Anti-DNA Ab, Double-Stranded  -     Anti-Histone Antibody  -     Anti-Scleroderma Antibody  -     Sjogrens syndrome-A extractable nuclear antibody  -     Sjogrens syndrome-B extractable nuclear antibody  -     Sedimentation rate  -     Rheumatoid Factor  -     C-Reactive Protein  -     Myeloperoxidase Antibody (MPO)  -     GLOMERULAR BASEMENT MEMBRANE ANTIBODIES  -     ANTI-NEUTROPHILIC CYTOPLASMIC ANTIBODY  -     Anti-parietal antibody  -     C3 Complement  -     C4 Complement  -     Complement, Total  -     Ribosomal P Antibody, SHERI  -     Vitamin D  -     Vitamin B6  -     Vitamin B1  -     pilocarpine HCl (SALAGEN) 7.5 mg tablet; Take 1 tablet (7.5 mg total) by mouth 3 (three) times daily.  -     predniSONE (DELTASONE) 5 MG tablet; Take 2 tablets (10 mg total) by mouth daily as needed (flare).  -     methylPREDNISolone acetate injection 80 mg  -     cyanocobalamin injection 1,000 mcg  -     needle, disp, 30 gauge 30 gauge x 1/2" Ndle; 1 each by Misc.(Non-Drug; Combo Route) route once a week.  -     blunt needle, disposable 18 x 1 1/2 " Ndle; 1 Syringe by Misc.(Non-Drug; Combo Route) route once a week.  -     ketorolac injection 60 mg  -     folic acid-vit B6-vit B12 2.5-25-2 mg (FOLBIC OR EQUIV) 2.5-25-2 mg Tab; Take 1 tablet by mouth once daily.    Livedo reticularis  -     FUAD Screen w/Reflex; Future  -     Anti Sm/RNP Antibody; Future  -     Anti-DNA Ab, Double-Stranded; Future  -     Anti-Histone Antibody; Future  -     Anti-Scleroderma Antibody; Future  -     Sjogrens syndrome-A extractable nuclear antibody; Future  -     Sjogrens syndrome-B extractable nuclear antibody; Future  -     Sedimentation rate; Future  -     Rheumatoid Factor; Future  -     C-Reactive Protein; Future  -     Myeloperoxidase Antibody (MPO); Future  -     GLOMERULAR BASEMENT MEMBRANE ANTIBODIES; Future  -     famotidine (PEPCID) 40 MG tablet; Take 1 tablet (40 " "mg total) by mouth 2 (two) times daily as needed for Heartburn.  -     ANTI-NEUTROPHILIC CYTOPLASMIC ANTIBODY; Future  -     Anti-parietal antibody; Future  -     C3 Complement; Future  -     C4 Complement; Future  -     Complement, Total; Future  -     Ribosomal P Antibody, SHERI; Future  -     Vitamin D; Future  -     Vitamin B6; Future  -     Vitamin B1; Future  -     FUAD Screen w/Reflex  -     Anti Sm/RNP Antibody  -     Anti-DNA Ab, Double-Stranded  -     Anti-Histone Antibody  -     Anti-Scleroderma Antibody  -     Sjogrens syndrome-A extractable nuclear antibody  -     Sjogrens syndrome-B extractable nuclear antibody  -     Sedimentation rate  -     Rheumatoid Factor  -     C-Reactive Protein  -     Myeloperoxidase Antibody (MPO)  -     GLOMERULAR BASEMENT MEMBRANE ANTIBODIES  -     ANTI-NEUTROPHILIC CYTOPLASMIC ANTIBODY  -     Anti-parietal antibody  -     C3 Complement  -     C4 Complement  -     Complement, Total  -     Ribosomal P Antibody, SHERI  -     Vitamin D  -     Vitamin B6  -     Vitamin B1  -     pilocarpine HCl (SALAGEN) 7.5 mg tablet; Take 1 tablet (7.5 mg total) by mouth 3 (three) times daily.  -     predniSONE (DELTASONE) 5 MG tablet; Take 2 tablets (10 mg total) by mouth daily as needed (flare).  -     methylPREDNISolone acetate injection 80 mg  -     cyanocobalamin injection 1,000 mcg  -     needle, disp, 30 gauge 30 gauge x 1/2" Ndle; 1 each by Misc.(Non-Drug; Combo Route) route once a week.  -     blunt needle, disposable 18 x 1 1/2 " Ndle; 1 Syringe by Misc.(Non-Drug; Combo Route) route once a week.  -     ketorolac injection 60 mg  -     folic acid-vit B6-vit B12 2.5-25-2 mg (FOLBIC OR EQUIV) 2.5-25-2 mg Tab; Take 1 tablet by mouth once daily.    Pyridoxine deficiency  -     Vitamin B6; Future  -     Vitamin B6  -     pilocarpine HCl (SALAGEN) 7.5 mg tablet; Take 1 tablet (7.5 mg total) by mouth 3 (three) times daily.  -     predniSONE (DELTASONE) 5 MG tablet; Take 2 tablets (10 mg " "total) by mouth daily as needed (flare).  -     methylPREDNISolone acetate injection 80 mg  -     cyanocobalamin injection 1,000 mcg  -     needle, disp, 30 gauge 30 gauge x 1/2" Ndle; 1 each by Misc.(Non-Drug; Combo Route) route once a week.  -     blunt needle, disposable 18 x 1 1/2 " Ndle; 1 Syringe by Misc.(Non-Drug; Combo Route) route once a week.  -     ketorolac injection 60 mg  -     folic acid-vit B6-vit B12 2.5-25-2 mg (FOLBIC OR EQUIV) 2.5-25-2 mg Tab; Take 1 tablet by mouth once daily.       Discussed  sjogren's, laryngeal dystonia  Consider Imuran  Prn prednisone    More than 50% of the 60 minute encounter was spent face to face counseling the patient regarding current status and future plan of care as well as side of the medications. All questions were answered to patient's satisfaction   "

## 2023-05-13 ENCOUNTER — HOSPITAL ENCOUNTER (OUTPATIENT)
Dept: RADIOLOGY | Facility: HOSPITAL | Age: 68
Discharge: HOME | End: 2023-05-13
Attending: INTERNAL MEDICINE
Payer: MEDICARE

## 2023-05-13 DIAGNOSIS — N18.31 CHRONIC KIDNEY DISEASE, STAGE 3A: ICD-10-CM

## 2023-05-13 DIAGNOSIS — M54.2 NECK PAIN: ICD-10-CM

## 2023-05-13 DIAGNOSIS — D89.89: ICD-10-CM

## 2023-05-13 DIAGNOSIS — M79.7 FIBROMYALGIA: ICD-10-CM

## 2023-05-13 PROCEDURE — 72070 XR THORACIC SPINE AP LATERAL: ICD-10-PCS | Mod: 26,,, | Performed by: RADIOLOGY

## 2023-05-13 PROCEDURE — 72070 X-RAY EXAM THORAC SPINE 2VWS: CPT | Mod: TC,FY,PO

## 2023-05-13 PROCEDURE — 72100 X-RAY EXAM L-S SPINE 2/3 VWS: CPT | Mod: 26,,, | Performed by: RADIOLOGY

## 2023-05-13 PROCEDURE — 72040 X-RAY EXAM NECK SPINE 2-3 VW: CPT | Mod: 26,,, | Performed by: RADIOLOGY

## 2023-05-13 PROCEDURE — 72040 XR CERVICAL SPINE AP LATERAL: ICD-10-PCS | Mod: 26,,, | Performed by: RADIOLOGY

## 2023-05-13 PROCEDURE — 72070 X-RAY EXAM THORAC SPINE 2VWS: CPT | Mod: 26,,, | Performed by: RADIOLOGY

## 2023-05-13 PROCEDURE — 72100 XR LUMBAR SPINE AP AND LATERAL: ICD-10-PCS | Mod: 26,,, | Performed by: RADIOLOGY

## 2023-05-13 PROCEDURE — 72100 X-RAY EXAM L-S SPINE 2/3 VWS: CPT | Mod: TC,FY,PO

## 2023-05-13 PROCEDURE — 72040 X-RAY EXAM NECK SPINE 2-3 VW: CPT | Mod: TC,FY,PO

## 2023-06-09 LAB
ALBUMIN SERPL-MCNC: 3.8 G/DL (ref 3.6–5.1)
ALBUMIN/GLOB SERPL: 1.1 (CALC) (ref 1–2.5)
ALP SERPL-CCNC: 75 U/L (ref 37–153)
ALT SERPL-CCNC: 21 U/L (ref 6–29)
AST SERPL-CCNC: 33 U/L (ref 10–35)
BASOPHILS # BLD AUTO: 39 CELLS/UL (ref 0–200)
BASOPHILS NFR BLD AUTO: 0.7 %
BILIRUB SERPL-MCNC: 0.3 MG/DL (ref 0.2–1.2)
BUN SERPL-MCNC: 17 MG/DL (ref 7–25)
BUN/CREAT SERPL: ABNORMAL (CALC) (ref 6–22)
CALCIUM SERPL-MCNC: 9.5 MG/DL (ref 8.6–10.4)
CHLORIDE SERPL-SCNC: 103 MMOL/L (ref 98–110)
CO2 SERPL-SCNC: 27 MMOL/L (ref 20–32)
CREAT SERPL-MCNC: 0.94 MG/DL (ref 0.6–1)
EGFR: 65 ML/MIN/1.73M2
EOSINOPHIL # BLD AUTO: 90 CELLS/UL (ref 15–500)
EOSINOPHIL NFR BLD AUTO: 1.6 %
ERYTHROCYTE [DISTWIDTH] IN BLOOD BY AUTOMATED COUNT: 16.8 % (ref 11–15)
FERRITIN SERPL-MCNC: 130 NG/ML (ref 16–288)
FOLATE SERPL-MCNC: >24 NG/ML
GLOBULIN SER CALC-MCNC: 3.5 G/DL (CALC) (ref 1.9–3.7)
GLUCOSE SERPL-MCNC: 116 MG/DL (ref 65–99)
HCT VFR BLD AUTO: 36.4 % (ref 35–45)
HGB BLD-MCNC: 11.4 G/DL (ref 11.7–15.5)
IRON SATN MFR SERPL: 25 % (CALC) (ref 16–45)
IRON SERPL-MCNC: 81 MCG/DL (ref 45–160)
LYMPHOCYTES # BLD AUTO: 1686 CELLS/UL (ref 850–3900)
LYMPHOCYTES NFR BLD AUTO: 30.1 %
MCH RBC QN AUTO: 28.4 PG (ref 27–33)
MCHC RBC AUTO-ENTMCNC: 31.3 G/DL (ref 32–36)
MCV RBC AUTO: 90.5 FL (ref 80–100)
MONOCYTES # BLD AUTO: 442 CELLS/UL (ref 200–950)
MONOCYTES NFR BLD AUTO: 7.9 %
NEUTROPHILS # BLD AUTO: 3343 CELLS/UL (ref 1500–7800)
NEUTROPHILS NFR BLD AUTO: 59.7 %
PLATELET # BLD AUTO: 285 THOUSAND/UL (ref 140–400)
PMV BLD REES-ECKER: 9.5 FL (ref 7.5–12.5)
POTASSIUM SERPL-SCNC: 4.3 MMOL/L (ref 3.5–5.3)
PROT SERPL-MCNC: 7.3 G/DL (ref 6.1–8.1)
RBC # BLD AUTO: 4.02 MILLION/UL (ref 3.8–5.1)
SODIUM SERPL-SCNC: 137 MMOL/L (ref 135–146)
TIBC SERPL-MCNC: 318 MCG/DL (CALC) (ref 250–450)
VIT B12 SERPL-MCNC: 824 PG/ML (ref 200–1100)
WBC # BLD AUTO: 5.6 THOUSAND/UL (ref 3.8–10.8)

## 2023-06-15 LAB
25(OH)D3 SERPL-MCNC: 64 NG/ML (ref 30–100)
ANA PAT SER IF-IMP: ABNORMAL
ANA PAT SER IF-IMP: ABNORMAL
ANA SER QL IF: POSITIVE
ANA TITR SER IF: ABNORMAL TITER
ANA TITR SER IF: ABNORMAL TITER
C3 SERPL-MCNC: 114 MG/DL (ref 83–193)
C4 SERPL-MCNC: 24 MG/DL (ref 15–57)
CH50 SERPL-ACNC: >60 U/ML (ref 31–60)
CRP SERPL-MCNC: 7.9 MG/L
DSDNA AB SER-ACNC: <1 IU/ML
ENA SCL70 AB SER IA-ACNC: NORMAL AI
ERYTHROCYTE [SEDIMENTATION RATE] IN BLOOD BY WESTERGREN METHOD: 33 MM/H
HISTONE AB SER IA-ACNC: 1.3 U
RHEUMATOID FACT SERPL-ACNC: <14 IU/ML
VIT B1 BLD-SCNC: 195 NMOL/L (ref 78–185)
VIT B6 SERPL-MCNC: 72.1 NG/ML (ref 2.1–21.7)

## 2023-06-21 LAB
BM IGG SER IA-ACNC: <1 AI
ENA SM AB SER IA-ACNC: NORMAL AI
ENA SM+RNP AB SER IA-ACNC: NORMAL AI
ENA SS-A AB SER IA-ACNC: ABNORMAL AI
ENA SS-B AB SER IA-ACNC: ABNORMAL AI
MYELOPEROXIDASE AB SER IA-ACNC: <1 AI
PCA AB SER QL: NEGATIVE
RIBOSOMAL P AB SER-ACNC: NORMAL AI
TPMT GENE MUT ANL BLD/T: NORMAL

## 2023-07-11 ENCOUNTER — TELEPHONE (OUTPATIENT)
Dept: HEMATOLOGY/ONCOLOGY | Facility: CLINIC | Age: 68
End: 2023-07-11

## 2023-07-13 ENCOUNTER — TELEPHONE (OUTPATIENT)
Dept: HEMATOLOGY/ONCOLOGY | Facility: CLINIC | Age: 68
End: 2023-07-13

## 2023-07-26 ENCOUNTER — TELEPHONE (OUTPATIENT)
Dept: HEMATOLOGY/ONCOLOGY | Facility: CLINIC | Age: 68
End: 2023-07-26

## 2023-07-26 ENCOUNTER — TELEPHONE (OUTPATIENT)
Dept: RHEUMATOLOGY | Facility: CLINIC | Age: 68
End: 2023-07-26
Payer: MEDICARE

## 2023-07-26 NOTE — TELEPHONE ENCOUNTER
----- Message from Yessica Metz NP sent at 7/26/2023 12:28 PM CDT -----  She can wait to talk to C     ----- Message -----  From: Sindy Thornton RN  Sent: 7/26/2023   8:54 AM CDT  To: Yessica Metz NP    No showed last appt, cancelled the one before that, is now rescheduled to Sept. Labs appear stable, anything you would like me to tell her at this time or does she need to wait until she sees Dr. Garcia in Sept?  ----- Message -----  From: Amada Crawford  Sent: 7/25/2023   2:20 PM CDT  To: Jose Hassan Staff    Pt would like a call back with lab results. She had her labs done on 06/08.     Please tell Dr. Garcia that she is so sorry that she missed her appt. She isn't very savvy on the computer for [employer], she is asking if we could send her email to remind about appts.     She is r/s for 09/19     [phone]

## 2023-07-26 NOTE — TELEPHONE ENCOUNTER
Attempted to contact pt regarding appointment today. Left a message informing Dr Carbajal had a cancellation for 10 am. Advised to contact office if she can make the appointment.

## 2023-07-26 NOTE — TELEPHONE ENCOUNTER
Patient made aware that due to multiple missed appt that Dr. Garcia will be able to review labs with her at her scheduled appt. Verbalized understanding.

## 2023-07-28 ENCOUNTER — TELEPHONE (OUTPATIENT)
Dept: RHEUMATOLOGY | Facility: CLINIC | Age: 68
End: 2023-07-28
Payer: MEDICARE

## 2023-07-28 NOTE — TELEPHONE ENCOUNTER
----- Message from Carla Castillocarlos sent at 7/27/2023  4:56 PM CDT -----  Regarding: Sooner Appointment Request  Contact: patient's daughter at [phone]  Type:  Sooner Appointment Request    Name of Caller:  patient's daughter at [phone]    Additional Information:  patient was calling to get another appointment. She was not aware of the other appointment because her phone number was not in the system, only her daughter's who was out of the country at the time. Please call and advise. Thank you

## 2023-08-01 ENCOUNTER — TELEPHONE (OUTPATIENT)
Dept: RHEUMATOLOGY | Facility: CLINIC | Age: 68
End: 2023-08-01
Payer: MEDICARE

## 2023-08-01 NOTE — TELEPHONE ENCOUNTER
Attempted to contact patient to reschedule missed appointment. Unable to reach patient but was able to get in touch with daughter. Was able to get pt rescheduled for this month. Daughter will inform brother of appt date and time as she lives in [place].

## 2023-08-01 NOTE — TELEPHONE ENCOUNTER
----- Message from Lina Tomas sent at 8/1/2023  1:57 PM CDT -----  Contact: Peg daughter  Peg's mother missed her last appt w/dr hall on 7/26 and daughter Peg was out of the country but the phone number for the appt reminder was Peg's and not the patients, and when Vivien called that day Peg was flying and received the msg when she landed. The phone number has been corrected but her mother does need to get another appt broderick, important because was trying to help her to discuss the labs and what the next options are.  Call back at [phone] if no answer of if pt is confused call Peg at [phone] and thanks

## 2023-08-01 NOTE — TELEPHONE ENCOUNTER
----- Message from Lina Tomas sent at 8/1/2023  2:06 PM CDT -----  Contact: daughter peg  Patient had labs at [facility] sometime after seeing you on 5/11/23 so please call Peg back with the results at [phone] and thanks

## 2023-08-18 ENCOUNTER — TELEPHONE (OUTPATIENT)
Dept: RHEUMATOLOGY | Facility: CLINIC | Age: 68
End: 2023-08-18
Payer: MEDICARE

## 2023-08-18 NOTE — TELEPHONE ENCOUNTER
Poke with Dr Braden and informed her of the new treatment patient was put on for sjogren's. Asked if labs could be sent to her email for her to review. Nurse was able to print recent lab results and sent to [email or web address] and Dr Braden also lowered her dapsone to 25 mg.

## 2023-08-18 NOTE — TELEPHONE ENCOUNTER
----- Message from Cinda Morrison sent at 8/18/2023  9:57 AM CDT -----  Contact: Dr. Braden  Type:  Needs Medical Advice    Who Called: Dr. Braden   Best Call Back Number:  [phone]  Additional Information:  Discuss pt care, labs and medication...     Please call to advise/consult/peer to peer... Thank you...

## 2023-08-21 ENCOUNTER — OFFICE VISIT (OUTPATIENT)
Dept: RHEUMATOLOGY | Facility: CLINIC | Age: 68
End: 2023-08-21
Payer: MEDICARE

## 2023-08-21 VITALS
HEART RATE: 87 BPM | HEIGHT: 64 IN | SYSTOLIC BLOOD PRESSURE: 118 MMHG | BODY MASS INDEX: 31.35 KG/M2 | WEIGHT: 183.63 LBS | DIASTOLIC BLOOD PRESSURE: 60 MMHG

## 2023-08-21 DIAGNOSIS — R25.1 TREMOR: Primary | ICD-10-CM

## 2023-08-21 DIAGNOSIS — J38.3 LARYNGEAL DYSTONIA: ICD-10-CM

## 2023-08-21 DIAGNOSIS — T50.905A DRUG-INDUCED LUPUS ERYTHEMATOSUS: ICD-10-CM

## 2023-08-21 DIAGNOSIS — M79.7 FIBROMYALGIA: ICD-10-CM

## 2023-08-21 DIAGNOSIS — M35.0B SJOGREN SYNDROME WITH VASCULITIS: ICD-10-CM

## 2023-08-21 DIAGNOSIS — L93.2 DRUG-INDUCED LUPUS ERYTHEMATOSUS: ICD-10-CM

## 2023-08-21 DIAGNOSIS — F41.9 ANXIETY: ICD-10-CM

## 2023-08-21 DIAGNOSIS — R42 VERTIGO: ICD-10-CM

## 2023-08-21 DIAGNOSIS — L93.1 SUBACUTE CUTANEOUS LUPUS ERYTHEMATOSUS: ICD-10-CM

## 2023-08-21 PROCEDURE — 1101F PR PT FALLS ASSESS DOC 0-1 FALLS W/OUT INJ PAST YR: ICD-10-PCS | Mod: CPTII,S$GLB,, | Performed by: INTERNAL MEDICINE

## 2023-08-21 PROCEDURE — 3008F BODY MASS INDEX DOCD: CPT | Mod: CPTII,S$GLB,, | Performed by: INTERNAL MEDICINE

## 2023-08-21 PROCEDURE — 3288F PR FALLS RISK ASSESSMENT DOCUMENTED: ICD-10-PCS | Mod: CPTII,S$GLB,, | Performed by: INTERNAL MEDICINE

## 2023-08-21 PROCEDURE — 3078F DIAST BP <80 MM HG: CPT | Mod: CPTII,S$GLB,, | Performed by: INTERNAL MEDICINE

## 2023-08-21 PROCEDURE — 99215 PR OFFICE/OUTPT VISIT, EST, LEVL V, 40-54 MIN: ICD-10-PCS | Mod: S$GLB,,, | Performed by: INTERNAL MEDICINE

## 2023-08-21 PROCEDURE — 1101F PT FALLS ASSESS-DOCD LE1/YR: CPT | Mod: CPTII,S$GLB,, | Performed by: INTERNAL MEDICINE

## 2023-08-21 PROCEDURE — 99999 PR PBB SHADOW E&M-EST. PATIENT-LVL V: ICD-10-PCS | Mod: PBBFAC,,, | Performed by: INTERNAL MEDICINE

## 2023-08-21 PROCEDURE — 1159F MED LIST DOCD IN RCRD: CPT | Mod: CPTII,S$GLB,, | Performed by: INTERNAL MEDICINE

## 2023-08-21 PROCEDURE — 1125F AMNT PAIN NOTED PAIN PRSNT: CPT | Mod: CPTII,S$GLB,, | Performed by: INTERNAL MEDICINE

## 2023-08-21 PROCEDURE — 1125F PR PAIN SEVERITY QUANTIFIED, PAIN PRESENT: ICD-10-PCS | Mod: CPTII,S$GLB,, | Performed by: INTERNAL MEDICINE

## 2023-08-21 PROCEDURE — 3078F PR MOST RECENT DIASTOLIC BLOOD PRESSURE < 80 MM HG: ICD-10-PCS | Mod: CPTII,S$GLB,, | Performed by: INTERNAL MEDICINE

## 2023-08-21 PROCEDURE — 99215 OFFICE O/P EST HI 40 MIN: CPT | Mod: S$GLB,,, | Performed by: INTERNAL MEDICINE

## 2023-08-21 PROCEDURE — 99999 PR PBB SHADOW E&M-EST. PATIENT-LVL V: CPT | Mod: PBBFAC,,, | Performed by: INTERNAL MEDICINE

## 2023-08-21 PROCEDURE — 3008F PR BODY MASS INDEX (BMI) DOCUMENTED: ICD-10-PCS | Mod: CPTII,S$GLB,, | Performed by: INTERNAL MEDICINE

## 2023-08-21 PROCEDURE — 1159F PR MEDICATION LIST DOCUMENTED IN MEDICAL RECORD: ICD-10-PCS | Mod: CPTII,S$GLB,, | Performed by: INTERNAL MEDICINE

## 2023-08-21 PROCEDURE — 1160F PR REVIEW ALL MEDS BY PRESCRIBER/CLIN PHARMACIST DOCUMENTED: ICD-10-PCS | Mod: CPTII,S$GLB,, | Performed by: INTERNAL MEDICINE

## 2023-08-21 PROCEDURE — 1160F RVW MEDS BY RX/DR IN RCRD: CPT | Mod: CPTII,S$GLB,, | Performed by: INTERNAL MEDICINE

## 2023-08-21 PROCEDURE — 3074F PR MOST RECENT SYSTOLIC BLOOD PRESSURE < 130 MM HG: ICD-10-PCS | Mod: CPTII,S$GLB,, | Performed by: INTERNAL MEDICINE

## 2023-08-21 PROCEDURE — 3288F FALL RISK ASSESSMENT DOCD: CPT | Mod: CPTII,S$GLB,, | Performed by: INTERNAL MEDICINE

## 2023-08-21 PROCEDURE — 3074F SYST BP LT 130 MM HG: CPT | Mod: CPTII,S$GLB,, | Performed by: INTERNAL MEDICINE

## 2023-08-21 RX ORDER — DIPHENHYDRAMINE HCL 25 MG
TABLET ORAL
COMMUNITY

## 2023-08-21 RX ORDER — PREDNISONE 1 MG/1
3 TABLET ORAL DAILY
Qty: 90 TABLET | Refills: 5 | Status: SHIPPED | OUTPATIENT
Start: 2023-08-21 | End: 2024-02-17

## 2023-08-21 RX ORDER — METOPROLOL SUCCINATE 25 MG/1
25 TABLET, EXTENDED RELEASE ORAL NIGHTLY
COMMUNITY
Start: 2023-07-28 | End: 2023-08-21

## 2023-08-21 RX ORDER — PROPRANOLOL HYDROCHLORIDE 60 MG/1
60 CAPSULE, EXTENDED RELEASE ORAL DAILY
Qty: 30 CAPSULE | Refills: 0 | Status: SHIPPED | OUTPATIENT
Start: 2023-08-21

## 2023-08-21 RX ORDER — CYANOCOBALAMIN 1000 UG/ML
1000 INJECTION, SOLUTION INTRAMUSCULAR; SUBCUTANEOUS
COMMUNITY
Start: 2023-06-17 | End: 2023-09-22

## 2023-08-21 RX ORDER — ALPRAZOLAM 0.25 MG/1
0.25 TABLET ORAL 3 TIMES DAILY
Qty: 21 TABLET | Refills: 1 | Status: SHIPPED | OUTPATIENT
Start: 2023-08-21 | End: 2024-03-07

## 2023-08-21 RX ORDER — PROPRANOLOL HYDROCHLORIDE 10 MG/1
TABLET ORAL
Qty: 90 TABLET | Refills: 0 | Status: SHIPPED | OUTPATIENT
Start: 2023-08-21 | End: 2023-09-12

## 2023-08-21 RX ORDER — FLUOXETINE 10 MG/1
10 CAPSULE ORAL DAILY
Qty: 30 CAPSULE | Refills: 1 | Status: SHIPPED | OUTPATIENT
Start: 2023-08-21 | End: 2023-09-12

## 2023-08-21 ASSESSMENT — ROUTINE ASSESSMENT OF PATIENT INDEX DATA (RAPID3)
PATIENT GLOBAL ASSESSMENT SCORE: 3.5
PSYCHOLOGICAL DISTRESS SCORE: 2.2
FATIGUE SCORE: 1.1
TOTAL RAPID3 SCORE: 2.39
PAIN SCORE: 3
MDHAQ FUNCTION SCORE: 0.2

## 2023-08-21 NOTE — LETTER
August 21, 2023          No Recipients             [facility]  [address] [place] [address]  Phone: [phone]  Fax: [phone]   Patient: Heidi Romero   MR Number: [id number]   YOB: 1955   Date of Visit: 8/21/2023       Dear Dr. Kilpatrick Recipients:    Thank you for referring Heidi Romero to me for evaluation. Below are the relevant portions of my assessment and plan of care.            If you have questions, please do not hesitate to call me. I look forward to following Heidi along with you.    Sincerely,      Emeka Carbajal MD           CC    No Recipients

## 2023-08-21 NOTE — PROGRESS NOTES
Subjective:      Patient ID: Heidi Romero is a 68 y.o. female.    Chief Complaint: Disease Management    Follow up: 68 female with a history fibromyalgia and per pt she had sign of sle. She started with a rash and hives she say  and biopsy urticaria vasculitis,  her daughter has Graves and thyroiditis, she was recently restarted on thyroid meds. She also had a stent. She received J and J vaccine first then series of pfizer she felt fatigue and felt like that was the start of her symptoms. She was still severe fatigue, low energy and now has a benign tremors. She sees neurology and heme/onc she is taking iron and b12 injections. Everything started  in 2022. Derm dx  subcutaneous sle- dapsone and prednisone. She saw Dr Gupta  x 2 times. Sister  has psoriasis, granddaughter has  autoimmune hepatitis. She has hair, and breaking off and bald spots. She has a very shakey sound voice that is knew, no oral ulcers, hands swell, she has neck pain with grinding.family history of ITP (sister)  Dr Gupta saw pt generalized maculopapular eruption associated with positive FUAD, positive SSA, and positive SSB, and a skin biopsy possibly consistent with an autoimmune disorder - consider SUBACUTE CUTANEOUS LUPUS. She was started on plaquenil  and had haves , her mother had PMR  at 69 yrs old. She has a h/o PE post surgery hysterectomy. She started noting  burning hand and feet pain, she recently noted a voice change with shakey and weak cosistent with laryngeal dystonia ( my clinical impression)      Review of Systems   Constitutional:  Positive for activity change and chills. Negative for appetite change, diaphoresis and unexpected weight change.   HENT:  Positive for voice change. Negative for congestion, dental problem, ear discharge, ear pain, facial swelling, mouth sores, nosebleeds, postnasal drip, rhinorrhea, sinus pressure, sneezing, sore throat and tinnitus.    Eyes:  Negative for photophobia, pain,  "discharge, redness and itching.   Respiratory:  Positive for cough. Negative for apnea, chest tightness, shortness of breath and wheezing.    Cardiovascular:  Positive for leg swelling. Negative for chest pain and palpitations.   Gastrointestinal:  Positive for abdominal pain. Negative for abdominal distention, constipation, diarrhea, nausea and vomiting.   Endocrine: Negative for cold intolerance, heat intolerance, polydipsia and polyuria.   Genitourinary:  Negative for decreased urine volume, difficulty urinating, flank pain, frequency, hematuria and urgency.   Musculoskeletal:  Positive for arthralgias, back pain, gait problem, neck pain and neck stiffness.   Skin:  Positive for rash. Negative for pallor and wound.   Allergic/Immunologic: Negative for immunocompromised state.   Neurological:  Positive for speech difficulty. Negative for dizziness, tremors and numbness.   Hematological:  Negative for adenopathy. Does not bruise/bleed easily.   Psychiatric/Behavioral:  Negative for sleep disturbance. The patient is not nervous/anxious.         Objective:   /60 (BP Location: Left arm, Patient Position: Sitting, BP Method: Medium (Manual))   Pulse 87   Ht 5' 4.02" (1.626 m)   Wt 83.3 kg (183 lb 10.3 oz)   BMI 31.51 kg/m²   Physical Exam   Constitutional: She is oriented to person, place, and time. No distress.   HENT:   Head: Normocephalic and atraumatic.   Eyes: Pupils are equal, round, and reactive to light. Right eye exhibits no discharge. Left eye exhibits no discharge.   Neck: No thyromegaly present.   Cardiovascular: Normal rate, regular rhythm and normal heart sounds. Exam reveals no gallop and no friction rub.   No murmur heard.  Pulmonary/Chest: Breath sounds normal. She has no wheezes. She has no rales. She exhibits no tenderness.   Abdominal: There is no abdominal tenderness. There is no rebound and no guarding.   Musculoskeletal:         General: Tenderness and deformity present.      Right " shoulder: Tenderness present.      Left shoulder: Tenderness present.      Right elbow: Normal.      Left elbow: Tenderness present.      Right wrist: Tenderness present.      Left wrist: Tenderness present.      Cervical back: Neck supple.      Right knee: Effusion present. Tenderness present.      Left knee: Effusion present. Tenderness present.   Lymphadenopathy:     She has no cervical adenopathy.   Neurological: She is alert and oriented to person, place, and time. Gait normal.   Skin: Skin is dry. No rash noted. No erythema. There is pallor.   Psychiatric: Mood and affect normal.   Vitals reviewed.      Right Side Rheumatological Exam     Examination finds the elbow normal.    The patient is tender to palpation of the shoulder, wrist, knee, 1st PIP, 1st MCP, 2nd PIP, 2nd MCP, 3rd PIP, 3rd MCP, 4th PIP, 4th MCP, 5th PIP and 5th MCP    She has swelling of the 1st PIP, 1st MCP, 2nd PIP, 2nd MCP, 3rd PIP, 3rd MCP, 4th PIP, 4th MCP, 5th PIP and 5th MCP    Shoulder Exam   Tenderness Location: no tenderness    Range of Motion   Active abduction:  abnormal   Adduction: abnormal  Sensation: normal    Knee Exam   Patellofemoral Crepitus: positive  Effusion: positive  Sensation: normal    Hip Exam   Tenderness Location: posterior  Sensation: normal    Elbow/Wrist Exam   Tenderness Location: no tenderness  Sensation: normal    Left Side Rheumatological Exam     The patient is tender to palpation of the shoulder, elbow, wrist, knee, 1st PIP, 1st MCP, 2nd PIP, 2nd MCP, 3rd PIP, 3rd MCP, 4th PIP, 4th MCP, 5th PIP and 5th MCP.    She has swelling of the 1st PIP, 1st MCP, 2nd PIP, 2nd MCP, 3rd PIP, 3rd MCP, 4th PIP, 4th MCP, 5th PIP and 5th MCP    Shoulder Exam   Tenderness Location: no tenderness    Range of Motion   Active abduction:  abnormal   Sensation: normal    Knee Exam     Patellofemoral Crepitus: positive  Effusion: positive  Sensation: normal    Hip Exam   Tenderness Location: posterior  Sensation:  normal    Elbow/Wrist Exam   Sensation: normal      Back/Neck Exam   General Inspection   Gait: normal         Collected Updated Procedure    06/08/2023 0000 06/15/2023 2245 Antinuclear antibodies, titer and pattern [[id number]]   (Abnormal)    Component Value Units   FUAD Titer 1:40 High   titer   FUAD Pattern Nuclear, Homogeneous Abnormal      FUAD Titer 1:320 High   titer   FUAD Pattern Nuclear, Speckled Abnormal             06/08/2023 0000 06/15/2023 2245 Vitamin B6 [[id number]]   (Abnormal)    Component Value Units   Vitamin B6 72.1 High   ng/mL          06/08/2023 0000 06/15/2023 2245 Vitamin B1 [[id number]]   (Abnormal)    Component Value Units   Vitamin B1 (Thiamine), Blood 195 High   nmol/L          06/08/2023 0000 06/15/2023 2245 C4 Complement [[id number]] Component Value Units   Complement (C-4) 24 mg/dL          06/08/2023 0000 06/15/2023 2245 C3 Complement [[id number]] Component Value Units   Complement (C-3) 114 mg/dL          06/08/2023 0000 06/15/2023 2245 Sedimentation Rate [[id number]]   (Abnormal)    Component Value Units   Sed Rate 33 High  mm/h          06/08/2023 0000 06/15/2023 2245 Anti-Histone Antibody [[id number]]   (Abnormal)    Component Value Units   Histone Ab 1.3 High   U          06/08/2023 0000 06/15/2023 2245 Complement, Total [[id number]]   (Abnormal)    Component Value Units   Complement, Total (CH50) >60 High   U/mL          06/08/2023 0000 06/21/2023 1200 Thiopurine Methyltrans (TPMT) Genotyping [[id number]]   Blood    Component Value   TPMT Genotype See Below           06/08/2023 0000 06/15/2023 2245 Vitamin D [[id number]]   Blood    Component Value Units   Vitamin D, 25-OH, Total 64  ng/mL          06/08/2023 0000 06/21/2023 1200 Ribosomal P Antibody, SHERI [[id number]]   Blood    Component Value Units   Ribosomal P Ab <1.0 NEG AI          06/08/2023 0000 06/21/2023 1200 Anti-parietal antibody [[id number]]   Blood    Component Value   Parietal Cell Ab NEGATIVE          06/08/2023 0000  06/21/2023 1200 GLOMERULAR BASEMENT MEMBRANE ANTIBODIES [[id number]]   Blood    Component Value Units   Glomerular Basement Membrane Ab, IgG (MAYRA) <1.0  AI          06/08/2023 0000 06/21/2023 1200 Myeloperoxidase Antibody (MPO) [[id number]]   Blood    Component Value Units   Myeloperoxidase Ab <1.0  AI          06/08/2023 0000 06/15/2023 2245 C-Reactive Protein [[id number]]   Blood    Component Value Units   CRP 7.9 mg/L          06/08/2023 0000 06/15/2023 2245 Rheumatoid Factor [[id number]]   Blood    Component Value Units   Rheumatoid Factor <14 IU/mL          06/08/2023 0000 06/21/2023 1200 Sjogrens syndrome-B extractable nuclear antibody [[id number]]   (Abnormal)   Blood    Component Value Units   Anti-SSB Antibody 2.6 POS Abnormal  AI          06/08/2023 0000 06/21/2023 1200 Sjogrens syndrome-A extractable nuclear antibody [[id number]]   (Abnormal)   Blood    Component Value Units   Anti-SSA Antibody 5.7 POS Abnormal  AI          06/08/2023 0000 06/15/2023 2245 Anti-Scleroderma Antibody [[id number]]   Blood    Component Value Units   SCL-70 Antibody <1.0 NEG AI          06/08/2023 0000 06/15/2023 2245 Anti-DNA Ab, Double-Stranded [[id number]]   Blood    Component Value Units   ds DNA Ab <1  IU/mL          06/08/2023 0000 06/21/2023 1200 Anti Sm/RNP Antibody [[id number]]   Blood    Component Value Units   Anti Sm Antibody <1.0 NEG AI   SM/RNP Antibody <1.0 NEG AI          06/08/2023 0000 06/15/2023 2245 FUAD Screen w/Reflex [[id number]]   (Abnormal)   Blood    Component Value   FUAD POSITIVE Abnormal                         Assessment:     1. Tremor    2. Fibromyalgia    3. Drug-induced lupus erythematosus    4. Laryngeal dystonia    5. Sjogren syndrome with vasculitis    6. Subacute cutaneous lupus erythematosus    7. Vertigo    8. Anxiety            Plan:     Heidi was seen today for disease management.    Diagnoses and all orders for this visit:    Tremor  -     propranoloL (INDERAL LA) 60 MG 24 hr capsule; Take 1  capsule (60 mg total) by mouth once daily.  -     propranoloL (INDERAL) 10 MG tablet; 1/2 tab to 1 tab tid  -     Ambulatory referral/consult to ENT; Future    Fibromyalgia  -     predniSONE (DELTASONE) 1 MG tablet; Take 3 tablets (3 mg total) by mouth once daily.  -     Ambulatory referral/consult to ENT; Future  -     CBC Auto Differential; Future  -     Protein Electrophoresis, Serum; Future  -     Sjogrens syndrome-A extractable nuclear antibody; Future  -     Sjogrens syndrome-B extractable nuclear antibody; Future  -     C-Reactive Protein; Future  -     Sedimentation rate; Future  -     Comprehensive Metabolic Panel; Future  -     CBC Auto Differential  -     Protein Electrophoresis, Serum  -     Sjogrens syndrome-A extractable nuclear antibody  -     Sjogrens syndrome-B extractable nuclear antibody  -     C-Reactive Protein  -     Sedimentation rate  -     Comprehensive Metabolic Panel    Drug-induced lupus erythematosus  Comments:  subcutaneous  Orders:  -     predniSONE (DELTASONE) 1 MG tablet; Take 3 tablets (3 mg total) by mouth once daily.  -     Ambulatory referral/consult to ENT; Future  -     CBC Auto Differential; Future  -     Protein Electrophoresis, Serum; Future  -     Sjogrens syndrome-A extractable nuclear antibody; Future  -     Sjogrens syndrome-B extractable nuclear antibody; Future  -     C-Reactive Protein; Future  -     Sedimentation rate; Future  -     Comprehensive Metabolic Panel; Future  -     CBC Auto Differential  -     Protein Electrophoresis, Serum  -     Sjogrens syndrome-A extractable nuclear antibody  -     Sjogrens syndrome-B extractable nuclear antibody  -     C-Reactive Protein  -     Sedimentation rate  -     Comprehensive Metabolic Panel    Laryngeal dystonia  -     Ambulatory referral/consult to ENT; Future  -     CBC Auto Differential; Future  -     Protein Electrophoresis, Serum; Future  -     Sjogrens syndrome-A extractable nuclear antibody; Future  -     Sjogrens  syndrome-B extractable nuclear antibody; Future  -     C-Reactive Protein; Future  -     Sedimentation rate; Future  -     Comprehensive Metabolic Panel; Future  -     CBC Auto Differential  -     Protein Electrophoresis, Serum  -     Sjogrens syndrome-A extractable nuclear antibody  -     Sjogrens syndrome-B extractable nuclear antibody  -     C-Reactive Protein  -     Sedimentation rate  -     Comprehensive Metabolic Panel    Sjogren syndrome with vasculitis  -     Ambulatory referral/consult to ENT; Future  -     CBC Auto Differential; Future  -     Protein Electrophoresis, Serum; Future  -     Sjogrens syndrome-A extractable nuclear antibody; Future  -     Sjogrens syndrome-B extractable nuclear antibody; Future  -     C-Reactive Protein; Future  -     Sedimentation rate; Future  -     Comprehensive Metabolic Panel; Future  -     CBC Auto Differential  -     Protein Electrophoresis, Serum  -     Sjogrens syndrome-A extractable nuclear antibody  -     Sjogrens syndrome-B extractable nuclear antibody  -     C-Reactive Protein  -     Sedimentation rate  -     Comprehensive Metabolic Panel    Subacute cutaneous lupus erythematosus  -     Ambulatory referral/consult to ENT; Future  -     CBC Auto Differential; Future  -     Protein Electrophoresis, Serum; Future  -     Sjogrens syndrome-A extractable nuclear antibody; Future  -     Sjogrens syndrome-B extractable nuclear antibody; Future  -     C-Reactive Protein; Future  -     Sedimentation rate; Future  -     Comprehensive Metabolic Panel; Future  -     CBC Auto Differential  -     Protein Electrophoresis, Serum  -     Sjogrens syndrome-A extractable nuclear antibody  -     Sjogrens syndrome-B extractable nuclear antibody  -     C-Reactive Protein  -     Sedimentation rate  -     Comprehensive Metabolic Panel    Vertigo  -     CBC Auto Differential; Future  -     Protein Electrophoresis, Serum; Future  -     Sjogrens syndrome-A extractable nuclear antibody;  Future  -     Sjogrens syndrome-B extractable nuclear antibody; Future  -     C-Reactive Protein; Future  -     Sedimentation rate; Future  -     CBC Auto Differential  -     Protein Electrophoresis, Serum  -     Sjogrens syndrome-A extractable nuclear antibody  -     Sjogrens syndrome-B extractable nuclear antibody  -     C-Reactive Protein  -     Sedimentation rate    Anxiety  -     FLUoxetine 10 MG capsule; Take 1 capsule (10 mg total) by mouth once daily.  -     ALPRAZolam (XANAX) 0.25 MG tablet; Take 1 tablet (0.25 mg total) by mouth 3 (three) times daily. for 14 days       Pt has  a positive ssa/ssb , subcutaneous sle on dapsone, no rashes presently she   Discussed  sjogren's, laryngeal dystonia  We will gradually wean dapsone 25 mg qd  Prn prednisone    4. Her B/P 98/61  hr 87 sitting she is dizzy and weak and feeling bad.    More than 50% of the  40 minute encounter was spent face to face counseling the patient regarding current status and future plan of care as well as side effects  of the medications. All questions were answered to patient's satisfaction also includes  non-face to face time preparing to see the patient (eg, review of tests), Obtaining and/or reviewing separately obtained history, Documenting clinical information in the electronic or other health record, Independently interpreting results

## 2023-08-21 NOTE — PATIENT INSTRUCTIONS
salagen only at night   Wean prozac 10 mg daily x 30 days then every other day x 1 months then stop   Only take prednisone in am if skin or joints flare  Ent appointment   Discuss dc metoprolol add propanolol 10  1/2 tab bid as needed and check blood pressure

## 2023-09-12 ENCOUNTER — TELEPHONE (OUTPATIENT)
Dept: HEMATOLOGY/ONCOLOGY | Facility: CLINIC | Age: 68
End: 2023-09-12

## 2023-09-12 DIAGNOSIS — R25.1 TREMOR: ICD-10-CM

## 2023-09-12 DIAGNOSIS — F41.9 ANXIETY: ICD-10-CM

## 2023-09-12 RX ORDER — PROPRANOLOL HYDROCHLORIDE 10 MG/1
TABLET ORAL
Qty: 90 TABLET | Refills: 0 | Status: SHIPPED | OUTPATIENT
Start: 2023-09-12

## 2023-09-12 RX ORDER — FLUOXETINE 10 MG/1
10 CAPSULE ORAL
Qty: 30 CAPSULE | Refills: 1 | Status: SHIPPED | OUTPATIENT
Start: 2023-09-12

## 2023-09-19 ENCOUNTER — OFFICE VISIT (OUTPATIENT)
Dept: HEMATOLOGY/ONCOLOGY | Facility: CLINIC | Age: 68
End: 2023-09-19
Payer: MEDICARE

## 2023-09-19 VITALS
WEIGHT: 185.69 LBS | RESPIRATION RATE: 16 BRPM | HEIGHT: 64 IN | BODY MASS INDEX: 31.7 KG/M2 | TEMPERATURE: 97 F | DIASTOLIC BLOOD PRESSURE: 53 MMHG | HEART RATE: 75 BPM | SYSTOLIC BLOOD PRESSURE: 110 MMHG

## 2023-09-19 DIAGNOSIS — D63.1 ANEMIA IN CHRONIC KIDNEY DISEASE, UNSPECIFIED CKD STAGE: Primary | ICD-10-CM

## 2023-09-19 DIAGNOSIS — D50.9 IRON DEFICIENCY ANEMIA, UNSPECIFIED IRON DEFICIENCY ANEMIA TYPE: ICD-10-CM

## 2023-09-19 DIAGNOSIS — D64.9 ANEMIA, UNSPECIFIED TYPE: ICD-10-CM

## 2023-09-19 DIAGNOSIS — Z83.2 FAMILY HISTORY OF ITP: ICD-10-CM

## 2023-09-19 DIAGNOSIS — N18.9 ANEMIA IN CHRONIC KIDNEY DISEASE, UNSPECIFIED CKD STAGE: Primary | ICD-10-CM

## 2023-09-19 DIAGNOSIS — D64.9 ANEMIA, NORMOCYTIC NORMOCHROMIC: ICD-10-CM

## 2023-09-19 DIAGNOSIS — D51.9 ANEMIA DUE TO VITAMIN B12 DEFICIENCY, UNSPECIFIED B12 DEFICIENCY TYPE: ICD-10-CM

## 2023-09-19 PROCEDURE — 3074F PR MOST RECENT SYSTOLIC BLOOD PRESSURE < 130 MM HG: ICD-10-PCS | Mod: CPTII,S$GLB,, | Performed by: INTERNAL MEDICINE

## 2023-09-19 PROCEDURE — 1101F PT FALLS ASSESS-DOCD LE1/YR: CPT | Mod: CPTII,S$GLB,, | Performed by: INTERNAL MEDICINE

## 2023-09-19 PROCEDURE — 3008F PR BODY MASS INDEX (BMI) DOCUMENTED: ICD-10-PCS | Mod: CPTII,S$GLB,, | Performed by: INTERNAL MEDICINE

## 2023-09-19 PROCEDURE — 99213 OFFICE O/P EST LOW 20 MIN: CPT | Mod: S$GLB,,, | Performed by: INTERNAL MEDICINE

## 2023-09-19 PROCEDURE — 3288F FALL RISK ASSESSMENT DOCD: CPT | Mod: CPTII,S$GLB,, | Performed by: INTERNAL MEDICINE

## 2023-09-19 PROCEDURE — 1159F MED LIST DOCD IN RCRD: CPT | Mod: CPTII,S$GLB,, | Performed by: INTERNAL MEDICINE

## 2023-09-19 PROCEDURE — 1160F PR REVIEW ALL MEDS BY PRESCRIBER/CLIN PHARMACIST DOCUMENTED: ICD-10-PCS | Mod: CPTII,S$GLB,, | Performed by: INTERNAL MEDICINE

## 2023-09-19 PROCEDURE — 1101F PR PT FALLS ASSESS DOC 0-1 FALLS W/OUT INJ PAST YR: ICD-10-PCS | Mod: CPTII,S$GLB,, | Performed by: INTERNAL MEDICINE

## 2023-09-19 PROCEDURE — 1125F PR PAIN SEVERITY QUANTIFIED, PAIN PRESENT: ICD-10-PCS | Mod: CPTII,S$GLB,, | Performed by: INTERNAL MEDICINE

## 2023-09-19 PROCEDURE — 99213 PR OFFICE/OUTPT VISIT, EST, LEVL III, 20-29 MIN: ICD-10-PCS | Mod: S$GLB,,, | Performed by: INTERNAL MEDICINE

## 2023-09-19 PROCEDURE — 3074F SYST BP LT 130 MM HG: CPT | Mod: CPTII,S$GLB,, | Performed by: INTERNAL MEDICINE

## 2023-09-19 PROCEDURE — 3078F DIAST BP <80 MM HG: CPT | Mod: CPTII,S$GLB,, | Performed by: INTERNAL MEDICINE

## 2023-09-19 PROCEDURE — 3078F PR MOST RECENT DIASTOLIC BLOOD PRESSURE < 80 MM HG: ICD-10-PCS | Mod: CPTII,S$GLB,, | Performed by: INTERNAL MEDICINE

## 2023-09-19 PROCEDURE — 3288F PR FALLS RISK ASSESSMENT DOCUMENTED: ICD-10-PCS | Mod: CPTII,S$GLB,, | Performed by: INTERNAL MEDICINE

## 2023-09-19 PROCEDURE — 3008F BODY MASS INDEX DOCD: CPT | Mod: CPTII,S$GLB,, | Performed by: INTERNAL MEDICINE

## 2023-09-19 PROCEDURE — 1159F PR MEDICATION LIST DOCUMENTED IN MEDICAL RECORD: ICD-10-PCS | Mod: CPTII,S$GLB,, | Performed by: INTERNAL MEDICINE

## 2023-09-19 PROCEDURE — 1125F AMNT PAIN NOTED PAIN PRSNT: CPT | Mod: CPTII,S$GLB,, | Performed by: INTERNAL MEDICINE

## 2023-09-19 PROCEDURE — 1160F RVW MEDS BY RX/DR IN RCRD: CPT | Mod: CPTII,S$GLB,, | Performed by: INTERNAL MEDICINE

## 2023-09-19 NOTE — PROGRESS NOTES
"[facility]  PROGRESS NOTE -  Follow-up Visit      Subjective:       Patient ID:   NAME: Heidi Romero : 1955     68 y.o. female    Referring Doc: self-referral  Other Physicians: Gilma Escobedo (PCP); Jose Padgett (Neph); VANCE Gupta (retired); Verónica Shah (derm at [employer]); Sonya (Card)           Chief Complaint: fam hx/of ITP f/u     History of Present Illness:     Patient returns today for a regularly scheduled follow-up visit.  The patient is here today to go over the results of the recently ordered labs, tests and studies. She is here by herself today.    She saw Dr Casanova with neurology recently and they are planning to scan for to rule out Parkinson's.     She had recent ear infection    She saw Dr Carbajal with rheumatology    She has some residual fatigue, general malaise, but is better. Breathing ok with some stable BOYD.         ROS:   GEN: general malaise; fatigue  HEENT: normal with no HA's, sore throat, stiff neck, changes in vision  CV: normal with no CP, SOB, PND, intermittent BOYD  PULM: normal with no SOB, cough, hemoptysis, sputum or pleuritic pain  GI: normal with no abdominal pain, nausea, vomiting, constipation, diarrhea, melanotic stools, BRBPR, or hematemesis  : normal with no hematuria, dysuria  BREAST: normal with no mass, discharge, pain  SKIN: no recent rash breakouts      Pain Scale: 0    Allergies:  Review of patient's allergies indicates:   Allergen Reactions    Vistaril [hydroxyzine hcl] Swelling    Codeine      Other reaction(s): Abdominal Pain    Hydrochlorothiazide     Plaquenil [hydroxychloroquine] Hives    Triamterene        Medications:    Current Outpatient Medications:     ascorbic acid, vitamin C, (VITAMIN C) 500 MG tablet, Take 1,000 mg by mouth., Disp: , Rfl:     aspirin 81 MG Chew, Take 81 mg by mouth once daily., Disp: , Rfl:     blunt needle, disposable 18 x 1 1/2 " Ndle, 1 Syringe by Misc.(Non-Drug; Combo Route) route once a week., Disp: 25 each, " "Rfl: 3    calcium-vitamin D3 (OS- + D3) 500 mg-5 mcg (200 unit) per tablet, Take 1 tablet by mouth 2 (two) times daily with meals., Disp: , Rfl:     carboxymethylcellulose (REFRESH PLUS) 0.5 % Dpet, 1 drop 3 (three) times daily as needed., Disp: , Rfl:     cetirizine (ZYRTEC) 10 MG tablet, Take 10 mg by mouth once daily., Disp: , Rfl:     cholecalciferol, vitamin D3, (VITAMIN D3) 250 mcg (10,000 unit) Cap capsule, as directed Orally, Disp: , Rfl:     cyanocobalamin 1,000 mcg/mL injection, Inject 1,000 mcg into the skin every 30 days., Disp: , Rfl:     dapsone 25 MG Tab, Take 25 mg by mouth 2 (two) times daily., Disp: , Rfl:     diphenhydrAMINE (BENADRYL ALLERGY) 25 mg tablet, 1 tablet at bedtime as needed Orally Once a day for 30 day(s), Disp: , Rfl:     ergocalciferol, vitamin D2, (VITAMIN D ORAL), Take by mouth., Disp: , Rfl:     FLUoxetine 10 MG capsule, TAKE 1 CAPSULE BY MOUTH EVERY DAY, Disp: 30 capsule, Rfl: 1    folic acid-vit B6-vit B12 2.5-25-2 mg (FOLBIC OR EQUIV) 2.5-25-2 mg Tab, Take 1 tablet by mouth once daily., Disp: 90 tablet, Rfl: 3    hydrocortisone 2.5 % cream, hydrocortisone 2.5 % topical cream  APPLY TO AFFECTED AREA TWICE A DAY TO RASH ON FACE AS NEEDED FOR 7 DAYS, Disp: , Rfl:     ibuprofen (ADVIL,MOTRIN) 800 MG tablet, Take 800 mg by mouth every 6 (six) hours as needed for Pain., Disp: , Rfl:     iron-vitamin C 100-250 mg, ICAR-C, (ICAR-C) 100-250 mg Tab, Take 1 tablet by mouth once daily., Disp: 30 each, Rfl: 6    levothyroxine (SYNTHROID) 100 MCG tablet, Take 25 mcg by mouth before breakfast., Disp: , Rfl:     multivitamin (THERAGRAN) per tablet, Take 1 tablet by mouth once daily. Women's centrum, Disp: , Rfl:     needle, disp, 30 gauge 30 gauge x 1/2" Ndle, 1 each by Misc.(Non-Drug; Combo Route) route once a week., Disp: 25 each, Rfl: 3    NURTEC 75 mg odt, SMARTSI Tablet(s) Sublingual, Disp: , Rfl:     ondansetron (ZOFRAN) 4 MG tablet, Take 4 mg by mouth every 8 (eight) hours as " "needed for Nausea., Disp: , Rfl:     ondansetron (ZOFRAN-ODT) 8 MG TbDL, Take 8 mg by mouth every 6 (six) hours as needed., Disp: , Rfl:     pilocarpine HCl (SALAGEN) 7.5 mg tablet, Take 1 tablet (7.5 mg total) by mouth 3 (three) times daily., Disp: 90 tablet, Rfl: 11    predniSONE (DELTASONE) 1 MG tablet, Take 3 tablets (3 mg total) by mouth once daily., Disp: 90 tablet, Rfl: 5    predniSONE (DELTASONE) 5 MG tablet, Take 2 tablets (10 mg total) by mouth daily as needed (flare)., Disp: 60 tablet, Rfl: 3    propranoloL (INDERAL LA) 60 MG 24 hr capsule, Take 1 capsule (60 mg total) by mouth once daily., Disp: 30 capsule, Rfl: 0    propranoloL (INDERAL) 10 MG tablet, TAKE 1/2-1 TAB 3 TIMES DAILY, Disp: 90 tablet, Rfl: 0    rosuvastatin (CRESTOR) 20 MG tablet, Take 40 mg by mouth once daily., Disp: , Rfl:     triamcinolone acetonide 0.1% (KENALOG) 0.1 % cream, triamcinolone acetonide 0.1 % topical cream  APPLY TO AFFECTED AREA TWICE A DAY TO RASH ON NECK, CHEST, & BACK. NEVER USE ON FACE, Disp: , Rfl:     ALPRAZolam (XANAX) 0.25 MG tablet, Take 1 tablet (0.25 mg total) by mouth 3 (three) times daily. for 14 days, Disp: 21 tablet, Rfl: 1    PMHx/PSHx Updates:  See patient's last visit with me on 3/23/2023.  See H&P on 2/22/2023        Pathology:   Cancer Staging   No matching staging information was found for the patient.          Objective:     Vitals:  Blood pressure (!) 110/53, pulse 75, temperature 97.3 °F (36.3 °C), resp. rate 16, height 5' 4" (1.626 m), weight 84.2 kg (185 lb 11.2 oz).    Physical Examination:   GEN: no apparent distress, comfortable; AAOx3  HEAD: atraumatic and normocephalic  EYES: no pallor, no icterus, PERRLA  ENT: OMM, no pharyngeal erythema, external ears WNL; no nasal discharge; no thrush  NECK: no masses, thyroid normal, trachea midline, no LAD/LN's, supple  CV: RRR with no murmur; normal pulse; normal S1 and S2; no pedal edema  CHEST: Normal respiratory effort; CTAB; normal breath sounds; " no wheeze or crackles  ABDOM: nontender and nondistended; soft; normal bowel sounds; no rebound/guarding  MUSC/Skeletal: ROM normal; no crepitus; joints normal; no deformities or arthropathy  EXTREM: no clubbing, cyanosis, inflammation or swelling  SKIN: no rashes, lesions, ulcers, petechiae or subcutaneous nodules; no current rash issues  : no king  NEURO: grossly intact; motor/sensory WNL; AAOx3; no tremors  PSYCH: normal mood, affect and behavior;    LYMPH: normal cervical, supraclavicular, axillary and groin LN's          Labs:   Lab Results   Component Value Date    WBC 5.8 09/14/2023    HGB 12.4 09/14/2023    HCT 39.7 09/14/2023    MCV 91.3 09/14/2023     09/14/2023       CMP  Sodium   Date Value Ref Range Status   09/14/2023 141 135 - 146 mmol/L Final     Potassium   Date Value Ref Range Status   09/14/2023 3.7 3.5 - 5.3 mmol/L Final     Chloride   Date Value Ref Range Status   09/14/2023 105 98 - 110 mmol/L Final     CO2   Date Value Ref Range Status   09/14/2023 26 20 - 32 mmol/L Final     Glucose   Date Value Ref Range Status   09/14/2023 123 (H) 65 - 99 mg/dL Final     Comment:                   Fasting reference interval     For someone without known diabetes, a glucose value  between 100 and 125 mg/dL is consistent with  prediabetes and should be confirmed with a  follow-up test.          BUN   Date Value Ref Range Status   09/14/2023 11 7 - 25 mg/dL Final     Creatinine   Date Value Ref Range Status   09/14/2023 1.02 (H) 0.60 - 1.00 mg/dL Final     Calcium   Date Value Ref Range Status   09/14/2023 9.4 8.6 - 10.4 mg/dL Final     Total Protein   Date Value Ref Range Status   09/14/2023 7.1 6.1 - 8.1 g/dL Final     Albumin   Date Value Ref Range Status   09/14/2023 4.0 3.6 - 5.1 g/dL Final     Total Bilirubin   Date Value Ref Range Status   09/14/2023 0.3 0.2 - 1.2 mg/dL Final     Alkaline Phosphatase   Date Value Ref Range Status   03/07/2023 70 28 - 116 U/L Final   04/01/2022 69 38 - 145 U/L  Final     AST   Date Value Ref Range Status   09/14/2023 61 (H) 10 - 35 U/L Final     ALT   Date Value Ref Range Status   09/14/2023 39 (H) 6 - 29 U/L Final     Anion Gap   Date Value Ref Range Status   03/07/2023 10 7 - 16 mmol/L Final   12/31/2022 6 (L) 8 - 16 mmol/L Final     eGFR   Date Value Ref Range Status   09/14/2023 59 (L) > OR = 60 mL/min/1.73m2 Final     Lab Results   Component Value Date    [id number] 06/08/2023     Lab Results   Component Value Date    FOLATE >24.0 06/08/2023       Protein Electrophoresis, Serum  Order: [id number]  Status: Final result     Visible to patient: No (inaccessible in Patient Portal)     Next appt: 05/11/2023 at 09:00 AM in Rheumatology (Emeka Carbajal MD)     Dx: Family history of ITP; Anemia, normoc...     0 Result Notes          Component Ref Range & Units 3 wk ago  (2/24/23) 3 wk ago  (2/24/23) 2 mo ago  (12/31/22) 11 mo ago  (4/1/22)   Total Protein 6.1 - 8.1 g/dL 6.6  6.7   6.9 R    Albumin 3.8 - 4.8 g/dL 3.6 Low   3.7 R  3.7 R  3.8 R    Alpha-1-Globulins 0.2 - 0.3 g/dL 0.4 High        Alpha-2-Globulins 0.5 - 0.9 g/dL 0.8       Beta Globulin 0.4 - 0.6 g/dL 0.4       Beta-2 Microglobulin 0.2 - 0.5 g/dL 0.3       Gamma Globulin 0.8 - 1.7 g/dL 1.1       Interpretation  Pattern consistent with an acute phase reaction                Radiology/Diagnostic Studies:    No results found.    I have reviewed all available lab results and radiology reports.    Assessment/Plan:   (1) 68 y.o. female  with diagnosis of family history of ITP (sister) who has been self-referred for evaluation by medical hematology/oncology.      - check up to date CBC/Plat  - CRP, sed rate; B12/folate, vit D, and SPEP  - consider leukemia screen pending the above results    3/23/2023:  - her platelets are currently WNL; WBC is adequate    9/19/2023:  - latest CBC is WNL          (2) Mild anemia - NCNC parameters - check iron panel, B12/folate, SPEP     9/19/2023  - latest hgb wnl  - b12/folate  adequate  - iron panel wnl     (3) COPD and chronic active smoker     (4) CAD and Hypercholesterolemia     (5) CKD - followed by Dr Jose Padgett     (6) Fibromyalgia/FUAD positive/lupus - followed by Dr GILBERT Gupta in past with rheum     (7) Chronic HA's, balance issues, shakes - ? Neurological disorder - followed by neurology         VISIT DIAGNOSES:      Anemia in chronic kidney disease, unspecified CKD stage    Anemia, normocytic normochromic    Anemia, unspecified type    Family history of ITP          PLAN:  Check cbc/plat every 3 months; continue B12 monthly and ICAR-C daily;   F/u with neurology and vascular  F/u with rheumatologist  F/u with cardiology, dermatology, etc       RTC in  6 months  Fax note to Dayron Escobedo Pressor, Laura Williams; Felix Wyman       Discussion:   COVID-19 Discussion:     I had long discussion with patient and any applicable family about the COVID-19 coronavirus epidemic and the recommended precautions with regard to cancer and/or hematology patients. I have re-iterated the CDC recommendations for adequate hand washing, use of hand -like products, and coughing into elbow, etc. In addition, especially for our patients who are on chemotherapy and/or our otherwise immunocompromised patients, I have recommended avoidance of crowds, including movie theaters, restaurants, churches, etc. I have recommended avoidance of any sick or symptomatic family members and/or friends. I have also recommended avoidance of any raw and unwashed food products, and general avoidance of food items that have not been prepared by themselves. The patient has been asked to call us immediately with any symptom developments, issues, questions or other general concerns.        I spent over 25 mins of time with the patient. Reviewed results of the recently ordered labs, tests and studies; made directives with regards to the results. Over half of this time was spent couseling and coordinating care.    I  have explained all of the above in detail and the patient understands all of the current recommendation(s). I have answered all of their questions to the best of my ability and to their complete satisfaction.   The patient is to continue with the current management plan.            Electronically signed by Mo Garcia MD

## 2023-09-22 DIAGNOSIS — E53.8 B12 DEFICIENCY: Primary | ICD-10-CM

## 2023-09-22 RX ORDER — CYANOCOBALAMIN 1000 UG/ML
INJECTION, SOLUTION INTRAMUSCULAR; SUBCUTANEOUS
Qty: 3 ML | Refills: 1 | Status: SHIPPED | OUTPATIENT
Start: 2023-09-22

## 2023-10-01 DIAGNOSIS — R76.8 ANA POSITIVE: ICD-10-CM

## 2023-10-01 DIAGNOSIS — J38.3 LARYNGEAL DYSTONIA: ICD-10-CM

## 2023-10-01 DIAGNOSIS — R23.1 LIVEDO RETICULARIS: ICD-10-CM

## 2023-10-01 DIAGNOSIS — M54.2 NECK PAIN: ICD-10-CM

## 2023-10-01 DIAGNOSIS — M35.0B SJOGREN SYNDROME WITH VASCULITIS: ICD-10-CM

## 2023-10-01 DIAGNOSIS — L95.8 URTICARIAL VASCULITIS: ICD-10-CM

## 2023-10-01 DIAGNOSIS — M79.7 FIBROMYALGIA: ICD-10-CM

## 2023-10-01 DIAGNOSIS — E53.1 PYRIDOXINE DEFICIENCY: ICD-10-CM

## 2023-10-01 DIAGNOSIS — Z83.2 FAMILY HISTORY OF ITP: ICD-10-CM

## 2023-10-01 DIAGNOSIS — D89.89: ICD-10-CM

## 2023-10-02 RX ORDER — PREDNISONE 5 MG/1
10 TABLET ORAL DAILY PRN
Qty: 60 TABLET | Refills: 3 | Status: SHIPPED | OUTPATIENT
Start: 2023-10-02 | End: 2024-10-01

## 2023-11-03 ENCOUNTER — CLINICAL SUPPORT (OUTPATIENT)
Dept: AUDIOLOGY | Facility: CLINIC | Age: 68
End: 2023-11-03
Payer: MEDICARE

## 2023-11-03 ENCOUNTER — OFFICE VISIT (OUTPATIENT)
Dept: OTOLARYNGOLOGY | Facility: CLINIC | Age: 68
End: 2023-11-03
Payer: MEDICARE

## 2023-11-03 VITALS — BODY MASS INDEX: 31.47 KG/M2 | HEIGHT: 64 IN | WEIGHT: 184.31 LBS

## 2023-11-03 DIAGNOSIS — H72.91 TYMPANIC MEMBRANE PERFORATION, RIGHT: ICD-10-CM

## 2023-11-03 DIAGNOSIS — L93.1 SUBACUTE CUTANEOUS LUPUS ERYTHEMATOSUS: ICD-10-CM

## 2023-11-03 DIAGNOSIS — R25.1 TREMOR: ICD-10-CM

## 2023-11-03 DIAGNOSIS — J38.3 LARYNGEAL DYSTONIA: Primary | ICD-10-CM

## 2023-11-03 DIAGNOSIS — H93.292 IMPAIRED AUDITORY DISCRIMINATION, LEFT: ICD-10-CM

## 2023-11-03 DIAGNOSIS — M79.7 FIBROMYALGIA: ICD-10-CM

## 2023-11-03 DIAGNOSIS — T50.905A DRUG-INDUCED LUPUS ERYTHEMATOSUS: ICD-10-CM

## 2023-11-03 DIAGNOSIS — H91.90 HEARING DIFFICULTY, UNSPECIFIED LATERALITY: ICD-10-CM

## 2023-11-03 DIAGNOSIS — H91.90 HEARING DIFFICULTY, UNSPECIFIED LATERALITY: Primary | ICD-10-CM

## 2023-11-03 DIAGNOSIS — H90.3 BILATERAL SENSORINEURAL HEARING LOSS: Primary | ICD-10-CM

## 2023-11-03 DIAGNOSIS — M35.0B SJOGREN SYNDROME WITH VASCULITIS: ICD-10-CM

## 2023-11-03 DIAGNOSIS — R13.10 DYSPHAGIA, UNSPECIFIED TYPE: ICD-10-CM

## 2023-11-03 DIAGNOSIS — L93.2 DRUG-INDUCED LUPUS ERYTHEMATOSUS: ICD-10-CM

## 2023-11-03 PROCEDURE — 31579 PR LARYNGOSCOPY, FLEX/RIGID TELESCOPIC, W/STROBOSCOPY: ICD-10-PCS | Mod: S$GLB,,, | Performed by: STUDENT IN AN ORGANIZED HEALTH CARE EDUCATION/TRAINING PROGRAM

## 2023-11-03 PROCEDURE — 1126F AMNT PAIN NOTED NONE PRSNT: CPT | Mod: CPTII,S$GLB,, | Performed by: STUDENT IN AN ORGANIZED HEALTH CARE EDUCATION/TRAINING PROGRAM

## 2023-11-03 PROCEDURE — 92567 PR TYMPA2METRY: ICD-10-PCS | Mod: 52,S$GLB,, | Performed by: AUDIOLOGIST

## 2023-11-03 PROCEDURE — 99205 PR OFFICE/OUTPT VISIT, NEW, LEVL V, 60-74 MIN: ICD-10-PCS | Mod: 25,S$GLB,, | Performed by: STUDENT IN AN ORGANIZED HEALTH CARE EDUCATION/TRAINING PROGRAM

## 2023-11-03 PROCEDURE — 92557 COMPREHENSIVE HEARING TEST: CPT | Mod: S$GLB,,, | Performed by: AUDIOLOGIST

## 2023-11-03 PROCEDURE — 92557 PR COMPREHENSIVE HEARING TEST: ICD-10-PCS | Mod: S$GLB,,, | Performed by: AUDIOLOGIST

## 2023-11-03 PROCEDURE — 99999 PR PBB SHADOW E&M-EST. PATIENT-LVL V: ICD-10-PCS | Mod: PBBFAC,,, | Performed by: STUDENT IN AN ORGANIZED HEALTH CARE EDUCATION/TRAINING PROGRAM

## 2023-11-03 PROCEDURE — 1126F PR PAIN SEVERITY QUANTIFIED, NO PAIN PRESENT: ICD-10-PCS | Mod: CPTII,S$GLB,, | Performed by: STUDENT IN AN ORGANIZED HEALTH CARE EDUCATION/TRAINING PROGRAM

## 2023-11-03 PROCEDURE — 3008F BODY MASS INDEX DOCD: CPT | Mod: CPTII,S$GLB,, | Performed by: STUDENT IN AN ORGANIZED HEALTH CARE EDUCATION/TRAINING PROGRAM

## 2023-11-03 PROCEDURE — 99999 PR PBB SHADOW E&M-EST. PATIENT-LVL V: CPT | Mod: PBBFAC,,, | Performed by: STUDENT IN AN ORGANIZED HEALTH CARE EDUCATION/TRAINING PROGRAM

## 2023-11-03 PROCEDURE — 1159F PR MEDICATION LIST DOCUMENTED IN MEDICAL RECORD: ICD-10-PCS | Mod: CPTII,S$GLB,, | Performed by: STUDENT IN AN ORGANIZED HEALTH CARE EDUCATION/TRAINING PROGRAM

## 2023-11-03 PROCEDURE — 1101F PR PT FALLS ASSESS DOC 0-1 FALLS W/OUT INJ PAST YR: ICD-10-PCS | Mod: CPTII,S$GLB,, | Performed by: STUDENT IN AN ORGANIZED HEALTH CARE EDUCATION/TRAINING PROGRAM

## 2023-11-03 PROCEDURE — 3288F PR FALLS RISK ASSESSMENT DOCUMENTED: ICD-10-PCS | Mod: CPTII,S$GLB,, | Performed by: STUDENT IN AN ORGANIZED HEALTH CARE EDUCATION/TRAINING PROGRAM

## 2023-11-03 PROCEDURE — 3008F PR BODY MASS INDEX (BMI) DOCUMENTED: ICD-10-PCS | Mod: CPTII,S$GLB,, | Performed by: STUDENT IN AN ORGANIZED HEALTH CARE EDUCATION/TRAINING PROGRAM

## 2023-11-03 PROCEDURE — 99999 PR PBB SHADOW E&M-EST. PATIENT-LVL I: CPT | Mod: PBBFAC,,, | Performed by: AUDIOLOGIST

## 2023-11-03 PROCEDURE — 92567 TYMPANOMETRY: CPT | Mod: 52,S$GLB,, | Performed by: AUDIOLOGIST

## 2023-11-03 PROCEDURE — 1159F MED LIST DOCD IN RCRD: CPT | Mod: CPTII,S$GLB,, | Performed by: STUDENT IN AN ORGANIZED HEALTH CARE EDUCATION/TRAINING PROGRAM

## 2023-11-03 PROCEDURE — 31579 LARYNGOSCOPY TELESCOPIC: CPT | Mod: S$GLB,,, | Performed by: STUDENT IN AN ORGANIZED HEALTH CARE EDUCATION/TRAINING PROGRAM

## 2023-11-03 PROCEDURE — 99205 OFFICE O/P NEW HI 60 MIN: CPT | Mod: 25,S$GLB,, | Performed by: STUDENT IN AN ORGANIZED HEALTH CARE EDUCATION/TRAINING PROGRAM

## 2023-11-03 PROCEDURE — 99999 PR PBB SHADOW E&M-EST. PATIENT-LVL I: ICD-10-PCS | Mod: PBBFAC,,, | Performed by: AUDIOLOGIST

## 2023-11-03 PROCEDURE — 3288F FALL RISK ASSESSMENT DOCD: CPT | Mod: CPTII,S$GLB,, | Performed by: STUDENT IN AN ORGANIZED HEALTH CARE EDUCATION/TRAINING PROGRAM

## 2023-11-03 PROCEDURE — 1101F PT FALLS ASSESS-DOCD LE1/YR: CPT | Mod: CPTII,S$GLB,, | Performed by: STUDENT IN AN ORGANIZED HEALTH CARE EDUCATION/TRAINING PROGRAM

## 2023-11-03 RX ORDER — OFLOXACIN 3 MG/ML
5 SOLUTION AURICULAR (OTIC) 2 TIMES DAILY
Qty: 10 ML | Refills: 3 | Status: SHIPPED | OUTPATIENT
Start: 2023-11-03 | End: 2023-11-10

## 2023-11-03 RX ORDER — AZELASTINE 1 MG/ML
1 SPRAY, METERED NASAL 2 TIMES DAILY
Qty: 30 ML | Refills: 11 | Status: SHIPPED | OUTPATIENT
Start: 2023-11-03 | End: 2024-11-02

## 2023-11-03 RX ORDER — AZELASTINE HYDROCHLORIDE 0.5 MG/ML
1 SOLUTION/ DROPS OPHTHALMIC 2 TIMES DAILY PRN
Qty: 6 ML | Refills: 11 | Status: SHIPPED | OUTPATIENT
Start: 2023-11-03 | End: 2024-11-02

## 2023-11-03 NOTE — PROGRESS NOTES
Otolaryngology Clinic Note    Subjective:       Patient ID: Heidi Romero is a 68 y.o. female.    Chief Complaint: Sore Throat (hoarseness), Hearing Loss, and Sinus Problem      History of Present Illness: Heidi Romero is a 68 y.o. female referred by Dr. Carbajal who is following for drug induced lupus, fibromyalgia, laryngeal dystonia presenting with voice concerns. All started April 2022. All started together. Voice can be normal sometimes. If she talks a lot or is on the phone, gets worse. She sees neurology for tremors, she is on propanolol, started a few months ago, has helped hands but not voice. Does take xanax for anxiety, no change in voice with that either. Sometimes has swallowing issues, feels like food gets hung up in throat. Has had esophagus stretched in past and helped. Becoming more bothersome and would consider another dilation- was on Elizabeth last time. Is solids. No issues with liquids. Sometimes pills will get caught up. Does take have some reflux issues. Used to take omeprazole and caltrate. Stopped omeprazole with lupus issues. Has not noticed if reflux and swallowing are related but not very common.     Also reports that ears feel stopped up, does not hear very well. Has had infections with otorrhea on right a few times. Abx a few weeks ago for this. Feels foggy headed, off balance. Has had vertigo once in past. Does have constant runny nose/blowing nose. Sometimes headache. Otherwise not much pressure and congestion and sneezing. No nasal sprays. Was on allegra but has not been taking. Eyes and nose, sometimes lips itch. Spring and fall worse. No allergy testing. She is very dry. Is not taking pilocarpine anymore. Has sjogren's. Does have some neuropathy and blurry vision, cataract surgery 2 years ago but worsening.       Past Surgical History:   Procedure Laterality Date    BREAST BIOPSY Right     over 15 yrs ago benign    HYSTERECTOMY       Past Medical History:   Diagnosis Date     "Anemia in chronic renal disease 2/16/2023    Anemia in chronic renal disease 2/16/2023    Anemia, normocytic normochromic 2/16/2023    Anemia, unspecified 2/16/2023    COPD (chronic obstructive pulmonary disease)     Coronary artery disease     Family history of ITP 2/16/2023     Social Determinants of Health     Tobacco Use: High Risk (11/3/2023)    Patient History     Smoking Tobacco Use: Every Day     Smokeless Tobacco Use: Never     Passive Exposure: Not on file   Alcohol Use: Not on file   Financial Resource Strain: Not on file   Food Insecurity: Not on file   Transportation Needs: Not on file   Physical Activity: Not on file   Stress: Not on file   Social Connections: Not on file   Housing Stability: Not on file   Depression: Low Risk  (9/19/2023)    Depression     Last PHQ-4: Flowsheet Data: 2     Review of patient's allergies indicates:   Allergen Reactions    Vistaril [hydroxyzine hcl] Swelling    Codeine      Other reaction(s): Abdominal Pain    Hydrochlorothiazide     Plaquenil [hydroxychloroquine] Hives    Triamterene      Current Outpatient Medications   Medication Instructions    ALPRAZolam (XANAX) 0.25 mg, Oral, 3 times daily    ascorbic acid (vitamin C) (VITAMIN C) 1,000 mg, Oral    aspirin 81 mg, Oral, Daily    azelastine (ASTELIN) 137 mcg, Nasal, 2 times daily    azelastine (OPTIVAR) 0.05 % ophthalmic solution 1 drop, Both Eyes, 2 times daily PRN    blunt needle, disposable 18 x 1 1/2 " Ndle 1 Syringe, Misc.(Non-Drug; Combo Route), Weekly    calcium-vitamin D3 (OS- + D3) 500 mg-5 mcg (200 unit) per tablet 1 tablet, Oral, 2 times daily with meals    carboxymethylcellulose (REFRESH PLUS) 0.5 % Dpet 1 drop, 3 times daily PRN    cetirizine (ZYRTEC) 10 mg, Oral, Daily    cholecalciferol, vitamin D3, (VITAMIN D3) 250 mcg (10,000 unit) Cap capsule as directed Orally    cyanocobalamin 1,000 mcg/mL injection INJECT 1ML INTO THE SKIN EVERY 30 DAYS    dapsone 25 mg, Oral, 2 times daily    " "diphenhydrAMINE (BENADRYL ALLERGY) 25 mg tablet 1 tablet at bedtime as needed Orally Once a day for 30 day(s)    ergocalciferol, vitamin D2, (VITAMIN D ORAL) Oral    FLUoxetine 10 mg, Oral    folic acid-vit B6-vit B12 2.5-25-2 mg (FOLBIC OR EQUIV) 2.5-25-2 mg Tab 1 tablet, Oral, Daily    hydrocortisone 2.5 % cream hydrocortisone 2.5 % topical cream   APPLY TO AFFECTED AREA TWICE A DAY TO RASH ON FACE AS NEEDED FOR 7 DAYS    ibuprofen (ADVIL,MOTRIN) 800 mg, Oral, Every 6 hours PRN    iron-vitamin C 100-250 mg, ICAR-C, 100-250 mg Tab 1 tablet, Oral    levothyroxine (SYNTHROID) 25 mcg, Oral, Before breakfast    multivitamin (THERAGRAN) per tablet 1 tablet, Oral, Daily, Women's centrum    needle, disp, 30 gauge 30 gauge x 1/2" Ndle 1 each, Misc.(Non-Drug; Combo Route), Weekly    NURTEC 75 mg odt SMARTSI Tablet(s) Sublingual    ofloxacin (FLOXIN) 0.3 % otic solution 5 drops, Right Ear, 2 times daily    ondansetron (ZOFRAN) 4 mg, Oral, Every 8 hours PRN    ondansetron (ZOFRAN-ODT) 8 mg, Oral, Every 6 hours PRN    pilocarpine HCl (SALAGEN) 7.5 mg, Oral, 3 times daily    predniSONE (DELTASONE) 3 mg, Oral, Daily    predniSONE (DELTASONE) 10 mg, Oral, Daily PRN    propranoloL (INDERAL LA) 60 mg, Oral, Daily    propranoloL (INDERAL) 10 MG tablet TAKE 1/2-1 TAB 3 TIMES DAILY    rosuvastatin (CRESTOR) 40 mg, Oral, Daily    triamcinolone acetonide 0.1% (KENALOG) 0.1 % cream triamcinolone acetonide 0.1 % topical cream   APPLY TO AFFECTED AREA TWICE A DAY TO RASH ON NECK, CHEST, & BACK. NEVER USE ON FACE         ENT ROS negative except as stated above.     Patient answers are not available for this visit.            Objective:      There were no vitals filed for this visit.    General: NAD, well appearing  Eyes: Normal conjunctiva and lids  Face: symmetric, nerve intact  Nose: The nose is without any evidence of any deformity. The nasal mucosa is moist. The septum is midline with thick crest bilaterally. There is no evidence of " septal hematoma. The turbinates are without abnormality.   Ears: The ears are with normal-appearing pinna. Examination of the canals is normal appearing bilaterally. Right canal wet suprior, small perforation over ossicles on right. Mild attic retraction on right as well. No debris. Left TMI.  Hearing is grossly intact.  Mouth: No obvious abnormalities to the lips. The teeth are unremarkable. The gingivae are without any obvious evidence of infection or lesion. The oral mucosa is moist and pink. There are no obvious masses to the hard or soft palate.   Oropharynx: The uvula is midline.  The tongue is midline. The posterior pharynx is without erythema or exudate. The tonsils are normal appearing.  Salivary glands: The salivary glands are symmetric and not enlarged, no masses  Neck: No lymphadenopathy, trachea midline, thryoid not enlarged.  Psych: Normal mood and affect.   Neuro: Grossly intact  Speech: fluent, shaky, weak, tremulous        Pre-procedure diagnosis: The primary encounter diagnosis was Laryngeal dystonia. Diagnoses of Tremor, Fibromyalgia, Drug-induced lupus erythematosus, Sjogren syndrome with vasculitis, Subacute cutaneous lupus erythematosus, Dysphagia, unspecified type, and Tympanic membrane perforation, right were also pertinent to this visit.     Post-procedure diagnosis: same    Procedure: Flexible fiberoptic laryngoscopy with stroboscopy    Surgeon: Priscilla De La Rosa MD    Anesthesia: 4% Lidocaine with 0.25% Phenylephrine topical    Risks, benefits, and alternatives of the procedure were discussed with the patient, and the patient consented to the fiberoptic examination.  We applied a topical nasal decongestant and analgesic.  After adequate anesthesia was obtained, the flexible fiberoptic scope was passed through the nasal cavity. The entire pharynx (nasopharynx to hypopharynx) and the larynx were visualized. At the end of the examination, the scope was removed. The patient tolerated the  procedure well with no complications.     Findings:  -     Laryngeal mucosa is normal  -     Post-cricoid region: mild thickening  -     Lingual tonsils have Grade 2 hypertrophy  -     Adenoids have NO  hypertrophy  -     Right vocal fold: normal mobility     mass/lesion: none  -     Left vocal fold: normal mobility     mass/lesion: none  -     Other findings: slightly dull, boggy cords       Stroboscopy Findings:  - Closure: complete, questionable slightly longer closed phase but MPT is short  - Periodicity: slightly irregular intermittently  - Mucosal Wave: normal   - Amplitude: normal  - Symmetry: normal     Audiogram:            Assessment and Plan:       1. Laryngeal dystonia    2. Tremor    3. Fibromyalgia    4. Drug-induced lupus erythematosus    5. Sjogren syndrome with vasculitis    6. Subacute cutaneous lupus erythematosus    7. Dysphagia, unspecified type    8. Tympanic membrane perforation, right          Will try astelin nasal spray for what sounds like allergic rhinitis, if not helpful, can try atrovent. Avoid antihistamine with sjogrens.     Tm perforation on right. Floxin drops BID 7 days  Audio today with BL mild- severe hearing loss. No conductive component on right. She is medically cleared for hearing aids. She is interested.     Voice- could do voice therapy. Would recommend voice therapy, some component of [facility] therapy would likely benefit her. She has mild tremor noted on strobe but not severe. Has poor breathe support, short phonation time. Neurologist does have some concern for Parkinson's.   She will do with the voice center virtually.    Balance, dizziness. Offered VNG, will do vestibular PT for now. Has many medical issues, perforated ear drum, neuropathy, vision is blurred. No eye doctor recent.     Refer to GI for EGD for dilation as this helped prior    RTC: 8 weeks    Over 50 minutes was spent with this patient over 50% of which was spent counseling patient on issues above,  and plans as above.     Plan of care was discussed in detail with the patient, who agreed with the plan as above. All questions were answered in detail.     Priscilla De La Rosa MD  Otolaryngology

## 2023-11-03 NOTE — PROGRESS NOTES
Heidi Romero was seen 11/03/2023 for an audiological evaluation.      Pt reported difficulty hearing speech clearly.  She saw Dr. De La Rosa this morning and has a TM perf and a TM retraction pocket in her right ear.      Otoscopy revealed clear view of both external ear canals and tympanic membranes.  No obstructive cerumen present in either ear canal.       Audiogram results revealed a mild-to-severe sensorineural hearing loss for the right ear and a mild-to-severe sensorineural hearing loss for the left ear.  Speech Reception Thresholds were 25 dBHL for the right ear and 35 dBHL for the left ear.  Word recognition scores were good for the right ear and fair for the left ear.   Tympanograms were unable to obtain for the right ear and Type Ad for the left ear.    Audiogram results were reviewed in detail with patient and all questions were answered. Results will be reviewed by ENT at the completion of this note.      Recommend f/u with ENT, binaural amplification pending medical clearance, hearing protection for all loud sounds and annual audiogram to monitor hearing loss.

## 2023-11-15 DIAGNOSIS — F41.9 ANXIETY: ICD-10-CM

## 2023-11-15 RX ORDER — FLUOXETINE 10 MG/1
10 CAPSULE ORAL DAILY
Qty: 30 CAPSULE | Refills: 1 | Status: CANCELLED | OUTPATIENT
Start: 2023-11-15

## 2023-11-15 NOTE — TELEPHONE ENCOUNTER
Pharmacy requesting refill on Fluoxetine 10mg  Pt's LOV 08/21/2023  Pt's NOV 11/21/2023  Medication pending

## 2023-12-03 DIAGNOSIS — E53.8 B12 DEFICIENCY: ICD-10-CM

## 2023-12-03 DIAGNOSIS — D64.9 ANEMIA, NORMOCYTIC NORMOCHROMIC: ICD-10-CM

## 2023-12-03 DIAGNOSIS — D64.9 ANEMIA, UNSPECIFIED TYPE: ICD-10-CM

## 2023-12-04 RX ORDER — IRON,CARBONYL/ASCORBIC ACID 100-250 MG
1 TABLET ORAL
Qty: 90 TABLET | Refills: 2 | Status: SHIPPED | OUTPATIENT
Start: 2023-12-04

## 2023-12-13 ENCOUNTER — TELEPHONE (OUTPATIENT)
Dept: OTOLARYNGOLOGY | Facility: CLINIC | Age: 68
End: 2023-12-13
Payer: MEDICARE

## 2024-03-07 ENCOUNTER — OFFICE VISIT (OUTPATIENT)
Dept: RHEUMATOLOGY | Facility: CLINIC | Age: 69
End: 2024-03-07
Payer: MEDICARE

## 2024-03-07 VITALS
SYSTOLIC BLOOD PRESSURE: 105 MMHG | DIASTOLIC BLOOD PRESSURE: 70 MMHG | BODY MASS INDEX: 31.05 KG/M2 | HEIGHT: 64 IN | HEART RATE: 73 BPM | WEIGHT: 181.88 LBS

## 2024-03-07 DIAGNOSIS — M79.7 FIBROMYALGIA: ICD-10-CM

## 2024-03-07 DIAGNOSIS — R76.8 ANA POSITIVE: ICD-10-CM

## 2024-03-07 DIAGNOSIS — Z83.2 FAMILY HISTORY OF ITP: ICD-10-CM

## 2024-03-07 DIAGNOSIS — L65.9 ALOPECIA: Primary | ICD-10-CM

## 2024-03-07 DIAGNOSIS — L95.8 URTICARIAL VASCULITIS: ICD-10-CM

## 2024-03-07 DIAGNOSIS — R23.1 LIVEDO RETICULARIS: ICD-10-CM

## 2024-03-07 DIAGNOSIS — D89.89: ICD-10-CM

## 2024-03-07 DIAGNOSIS — J38.3 LARYNGEAL DYSTONIA: ICD-10-CM

## 2024-03-07 DIAGNOSIS — M54.2 NECK PAIN: ICD-10-CM

## 2024-03-07 DIAGNOSIS — E53.1 PYRIDOXINE DEFICIENCY: ICD-10-CM

## 2024-03-07 DIAGNOSIS — M35.0B SJOGREN SYNDROME WITH VASCULITIS: ICD-10-CM

## 2024-03-07 PROCEDURE — 99215 OFFICE O/P EST HI 40 MIN: CPT | Mod: 25,S$GLB,, | Performed by: INTERNAL MEDICINE

## 2024-03-07 PROCEDURE — 99999 PR PBB SHADOW E&M-EST. PATIENT-LVL III: CPT | Mod: PBBFAC,,, | Performed by: INTERNAL MEDICINE

## 2024-03-07 PROCEDURE — 96372 THER/PROPH/DIAG INJ SC/IM: CPT | Mod: S$GLB,,, | Performed by: INTERNAL MEDICINE

## 2024-03-07 RX ORDER — DEXAMETHASONE SODIUM PHOSPHATE 4 MG/ML
8 INJECTION, SOLUTION INTRA-ARTICULAR; INTRALESIONAL; INTRAMUSCULAR; INTRAVENOUS; SOFT TISSUE
Status: COMPLETED | OUTPATIENT
Start: 2024-03-07 | End: 2024-03-07

## 2024-03-07 RX ORDER — IRON,CARB/VIT C/VIT B12/FOLIC 100-250-1
1 TABLET ORAL
COMMUNITY
Start: 2023-10-27

## 2024-03-07 RX ORDER — FINASTERIDE 1 MG/1
1 TABLET, FILM COATED ORAL DAILY
Qty: 90 TABLET | Refills: 3 | Status: SHIPPED | OUTPATIENT
Start: 2024-03-07 | End: 2025-03-07

## 2024-03-07 RX ORDER — MINOXIDIL 2.5 MG/1
2.5 TABLET ORAL DAILY
Qty: 90 TABLET | Refills: 3 | Status: SHIPPED | OUTPATIENT
Start: 2024-03-07 | End: 2025-03-07

## 2024-03-07 RX ORDER — CYANOCOBALAMIN 1000 UG/ML
1000 INJECTION, SOLUTION INTRAMUSCULAR; SUBCUTANEOUS
Status: COMPLETED | OUTPATIENT
Start: 2024-03-07 | End: 2024-03-07

## 2024-03-07 RX ORDER — MINOXIDIL 2.5 MG/1
2.5 TABLET ORAL DAILY
Qty: 30 TABLET | Refills: 11
Start: 2024-03-07 | End: 2024-03-07 | Stop reason: SDUPTHER

## 2024-03-07 RX ORDER — PREGABALIN 25 MG/1
25 CAPSULE ORAL 2 TIMES DAILY
COMMUNITY
Start: 2023-11-14

## 2024-03-07 RX ADMIN — DEXAMETHASONE SODIUM PHOSPHATE 8 MG: 4 INJECTION, SOLUTION INTRA-ARTICULAR; INTRALESIONAL; INTRAMUSCULAR; INTRAVENOUS; SOFT TISSUE at 11:03

## 2024-03-07 RX ADMIN — CYANOCOBALAMIN 1000 MCG: 1000 INJECTION, SOLUTION INTRAMUSCULAR; SUBCUTANEOUS at 11:03

## 2024-03-07 ASSESSMENT — ROUTINE ASSESSMENT OF PATIENT INDEX DATA (RAPID3)
PSYCHOLOGICAL DISTRESS SCORE: 3.3
TOTAL RAPID3 SCORE: 5.17
FATIGUE SCORE: 1.1
PATIENT GLOBAL ASSESSMENT SCORE: 6
PAIN SCORE: 6.5
MDHAQ FUNCTION SCORE: 0.9

## 2024-03-07 NOTE — PROGRESS NOTES
Subjective:     Patient ID:  Heidi Romero    Chief Complaint:  Disease Management     History of Present Illness:  Follow up: 68 female with a history fibromyalgia and per pt she had sign of sle. She started with a rash and hives she say  and biopsy urticaria vasculitis, her daughter has Graves and thyroiditis, she was recently restarted on thyroid meds. She also had a stent. She received J and J vaccine first then series of pfizer she felt fatigue and felt like that was the start of her symptoms. She was still severe fatigue, low energy and now has a benign tremors. She sees neurology and heme/onc she is taking iron and b12 injections. Everything started in 2022. Derm dx subcutaneous sle- dapsone and prednisone. She saw Dr Gupta x 2 times. Sister has psoriasis, granddaughter has autoimmune hepatitis. She has hair, and breaking off and bald spots. She has a very shakey sound voice that is knew, no oral ulcers, hands swell, she has neck pain with grinding.family history of ITP (sister) Dr Gupta saw pt generalized maculopapular eruption associated with positive FUAD, positive SSA, and positive SSB, and a skin biopsy possibly consistent with an autoimmune disorder - consider SUBACUTE CUTANEOUS LUPUS. She was started on plaquenil and had haves , her mother had PMR  at 69 yrs old. She has a h/o PE post surgery hysterectomy. She started noting burning hand and feet pain, she recently noted a voice change with shakey and weak cosistent with laryngeal dystonia ( my clinical impression)     Rheumatologic History:   - Diagnosis/es:  - Positive serologies:  - Infectious screening labs:  - Previous Treatments:  - Current Treatments:     Interval History:   Hospitalization since last office visit: No    Patient Active Problem List    Diagnosis Date Noted    Autoimmune skin disease 2023    Ankylosing spondylitis of unspecified sites in spine 2023    Chronic kidney disease, stage 3a 2023     "Family history of ITP 02/16/2023    Anemia, normocytic normochromic 02/16/2023    Anemia, unspecified 02/16/2023    Anemia in chronic renal disease 02/16/2023    Fibromyalgia 08/08/2022     Past Surgical History:   Procedure Laterality Date    BREAST BIOPSY Right     over 15 yrs ago benign    HYSTERECTOMY       Social History     Tobacco Use    Smoking status: Every Day     Current packs/day: 0.50     Average packs/day: 0.5 packs/day for 54.5 years (27.2 ttl pk-yrs)     Types: Cigarettes     Start date: 9/18/1969    Smokeless tobacco: Never    Tobacco comments:     Patient has smoked off/on since she was 14     History reviewed. No pertinent family history.  Review of patient's allergies indicates:   Allergen Reactions    Vistaril [hydroxyzine hcl] Swelling    Codeine      Other reaction(s): Abdominal Pain    Hydrochlorothiazide     Plaquenil [hydroxychloroquine] Hives    Triamterene        Review of Systems   Review of Systems     Current Medications:  Current Outpatient Medications   Medication Instructions    ALPRAZolam (XANAX) 0.25 mg, Oral, 3 times daily    ascorbic acid (vitamin C) (VITAMIN C) 1,000 mg, Oral    aspirin 81 mg, Oral, Daily    azelastine (ASTELIN) 137 mcg, Nasal, 2 times daily    azelastine (OPTIVAR) 0.05 % ophthalmic solution 1 drop, Both Eyes, 2 times daily PRN    blunt needle, disposable 18 x 1 1/2 " Ndle 1 Syringe, Misc.(Non-Drug; Combo Route), Weekly    calcium-vitamin D3 (OS- + D3) 500 mg-5 mcg (200 unit) per tablet 1 tablet, Oral, 2 times daily with meals    carboxymethylcellulose (REFRESH PLUS) 0.5 % Dpet 1 drop, 3 times daily PRN    cetirizine (ZYRTEC) 10 mg, Oral, Daily    cholecalciferol, vitamin D3, (VITAMIN D3) 250 mcg (10,000 unit) Cap capsule as directed Orally    cyanocobalamin 1,000 mcg/mL injection INJECT 1ML INTO THE SKIN EVERY 30 DAYS    dapsone 25 mg, Oral, 2 times daily    diphenhydrAMINE (BENADRYL ALLERGY) 25 mg tablet 1 tablet at bedtime as needed Orally Once a day " "for 30 day(s)    ergocalciferol, vitamin D2, (VITAMIN D ORAL) Oral    finasteride (PROPECIA) 1 mg, Oral, Daily    FLUoxetine 10 mg, Oral    folic acid-vit B6-vit B12 2.5-25-2 mg (FOLBIC OR EQUIV) 2.5-25-2 mg Tab 1 tablet, Oral, Daily    hydrocortisone 2.5 % cream hydrocortisone 2.5 % topical cream   APPLY TO AFFECTED AREA TWICE A DAY TO RASH ON FACE AS NEEDED FOR 7 DAYS    ibuprofen (ADVIL,MOTRIN) 800 mg, Oral, Every 6 hours PRN    IRON 100 PLUS Tab 1 tablet, Oral    iron-vitamin C 100-250 mg, ICAR-C, 100-250 mg Tab 1 tablet, Oral    levothyroxine (SYNTHROID) 25 mcg, Oral, Before breakfast    minoxidiL (LONITEN) 2.5 mg, Oral, Daily    multivitamin (THERAGRAN) per tablet 1 tablet, Oral, Daily, Women's centrum    needle, disp, 30 gauge 30 gauge x 1/2" Ndle 1 each, Misc.(Non-Drug; Combo Route), Weekly    NURTEC 75 mg odt SMARTSI Tablet(s) Sublingual    ondansetron (ZOFRAN) 4 mg, Oral, Every 8 hours PRN    ondansetron (ZOFRAN-ODT) 8 mg, Oral, Every 6 hours PRN    pilocarpine HCl (SALAGEN) 7.5 mg, Oral, 3 times daily    predniSONE (DELTASONE) 10 mg, Oral, Daily PRN    pregabalin (LYRICA) 25 mg, Oral, 2 times daily    propranoloL (INDERAL LA) 60 mg, Oral, Daily    propranoloL (INDERAL) 10 MG tablet TAKE 1/2-1 TAB 3 TIMES DAILY    rosuvastatin (CRESTOR) 40 mg, Oral, Daily    triamcinolone acetonide 0.1% (KENALOG) 0.1 % cream triamcinolone acetonide 0.1 % topical cream   APPLY TO AFFECTED AREA TWICE A DAY TO RASH ON NECK, CHEST, & BACK. NEVER USE ON FACE         Objective:     Vitals:    24 0922   BP: 105/70   Pulse: 73   Weight: 82.5 kg (181 lb 14.1 oz)   Height: 5' 4.02" (1.626 m)   PainSc:   3   PainLoc: Generalized      Body mass index is 31.2 kg/m².     Physical Examinations:  Physical Exam     Disease Assessment Scores:  Patient's Global Assessment of arthritis (0-10): 3  Physician's Global Assessment of arthritis (0-10): 3  Number of Tender Joints (0-28): 4  Number of Swollen Joints (0-28): 4    There is " "currently no information documented on the homunculus. Go to the Rheumatology activity and complete the homunculus joint exam.          No data to display                Monitoring Lab Results:  Lab Results   Component Value Date    WBC 5.8 09/14/2023    RBC 4.35 09/14/2023    HGB 12.4 09/14/2023    HCT 39.7 09/14/2023    MCV 91.3 09/14/2023    MCH 28.5 09/14/2023    MCHC 31.2 (L) 09/14/2023    RDW 17.3 (H) 09/14/2023     09/14/2023        Lab Results   Component Value Date     09/14/2023    K 3.7 09/14/2023     09/14/2023    CO2 26 09/14/2023     (H) 09/14/2023    BUN 11 09/14/2023    CREATININE 1.02 (H) 09/14/2023    CALCIUM 9.4 09/14/2023    PROT 7.1 09/14/2023    ALBUMIN 4.0 09/14/2023    BILITOT 0.3 09/14/2023    ALKPHOS 70 03/07/2023    AST 61 (H) 09/14/2023    ALT 39 (H) 09/14/2023    ANIONGAP 10 03/07/2023    EGFRNORACEVR 59 (L) 09/14/2023       Lab Results   Component Value Date    SEDRATE 33 (H) 06/08/2023    CRP 7.9 06/08/2023        Lab Results   Component Value Date    ATZPJPWT95 824 06/08/2023        No results found for: "CHOL", "HDL", "LDLCALC", "TRIG"    Lab Results   Component Value Date    RF <14 06/08/2023    CCPANTIBODIE 8 08/08/2022     Lab Results   Component Value Date    DSDNA <1 06/08/2023    SMRNPAB <1.0 NEG 06/08/2023    IFB79JT <1.0 NEG 06/08/2023     No results found for: "HLABB27"    Infectious Disease Screening:  No results found for: "HEPBSAG", "HEPBCAB", "HEPBSAB", "HEPBSURFABQU", "HEPBIGM"  No results found for: "HEPCAB", "HEPCVAB", "HCVQUANTRES"  No results found for: "TBGOLDPLUS", "QUANTTBGDPL"  No results found for: "QUANTIFERON", "SVCMT", "QUANTAGVALUE", "QUANTNILVALU", "QUANTMITOGEN", "QFTTBAG", "QINT"     Imaging:  DEXA, Xrays, MRIs, CTs, etc    Old & Outside Medical Records:  Reviewed old and all outside medical records available in Care Everywhere    Current Medication Changes:  Medication List with Changes/Refills   New Medications    FINASTERIDE " "(PROPECIA) 1 MG TABLET    Take 1 tablet (1 mg total) by mouth once daily.    MINOXIDIL (LONITEN) 2.5 MG TABLET    Take 1 tablet (2.5 mg total) by mouth once daily.   Current Medications    ALPRAZOLAM (XANAX) 0.25 MG TABLET    Take 1 tablet (0.25 mg total) by mouth 3 (three) times daily. for 14 days    ASCORBIC ACID, VITAMIN C, (VITAMIN C) 500 MG TABLET    Take 1,000 mg by mouth.    ASPIRIN 81 MG CHEW    Take 81 mg by mouth once daily.    AZELASTINE (ASTELIN) 137 MCG (0.1 %) NASAL SPRAY    1 spray (137 mcg total) by Nasal route 2 (two) times daily.    AZELASTINE (OPTIVAR) 0.05 % OPHTHALMIC SOLUTION    Place 1 drop into both eyes 2 (two) times daily as needed (itchy eyes).    BLUNT NEEDLE, DISPOSABLE 18 X 1 1/2 " NDLE    1 Syringe by Misc.(Non-Drug; Combo Route) route once a week.    CALCIUM-VITAMIN D3 (OS- + D3) 500 MG-5 MCG (200 UNIT) PER TABLET    Take 1 tablet by mouth 2 (two) times daily with meals.    CARBOXYMETHYLCELLULOSE (REFRESH PLUS) 0.5 % DPET    1 drop 3 (three) times daily as needed.    CETIRIZINE (ZYRTEC) 10 MG TABLET    Take 10 mg by mouth once daily.    CHOLECALCIFEROL, VITAMIN D3, (VITAMIN D3) 250 MCG (10,000 UNIT) CAP CAPSULE    as directed Orally    CYANOCOBALAMIN 1,000 MCG/ML INJECTION    INJECT 1ML INTO THE SKIN EVERY 30 DAYS    DAPSONE 25 MG TAB    Take 25 mg by mouth 2 (two) times daily.    DIPHENHYDRAMINE (BENADRYL ALLERGY) 25 MG TABLET    1 tablet at bedtime as needed Orally Once a day for 30 day(s)    ERGOCALCIFEROL, VITAMIN D2, (VITAMIN D ORAL)    Take by mouth.    FLUOXETINE 10 MG CAPSULE    TAKE 1 CAPSULE BY MOUTH EVERY DAY    HYDROCORTISONE 2.5 % CREAM    hydrocortisone 2.5 % topical cream   APPLY TO AFFECTED AREA TWICE A DAY TO RASH ON FACE AS NEEDED FOR 7 DAYS    IBUPROFEN (ADVIL,MOTRIN) 800 MG TABLET    Take 800 mg by mouth every 6 (six) hours as needed for Pain.    IRON 100 PLUS TAB    Take 1 tablet by mouth.    IRON-VITAMIN C 100-250 MG, ICAR-C, 100-250 MG TAB    TAKE 1 " "TABLET BY MOUTH EVERY DAY    LEVOTHYROXINE (SYNTHROID) 100 MCG TABLET    Take 25 mcg by mouth before breakfast.    MULTIVITAMIN (THERAGRAN) PER TABLET    Take 1 tablet by mouth once daily. Women's centrum    NEEDLE, DISP, 30 GAUGE 30 GAUGE X 1/2" NDLE    1 each by Misc.(Non-Drug; Combo Route) route once a week.    NURTEC 75 MG ODT    SMARTSI Tablet(s) Sublingual    ONDANSETRON (ZOFRAN) 4 MG TABLET    Take 4 mg by mouth every 8 (eight) hours as needed for Nausea.    ONDANSETRON (ZOFRAN-ODT) 8 MG TBDL    Take 8 mg by mouth every 6 (six) hours as needed.    PILOCARPINE HCL (SALAGEN) 7.5 MG TABLET    Take 1 tablet (7.5 mg total) by mouth 3 (three) times daily.    PREDNISONE (DELTASONE) 5 MG TABLET    TAKE 2 TABLETS (10 MG TOTAL) BY MOUTH DAILY AS NEEDED (FLARE).    PREGABALIN (LYRICA) 25 MG CAPSULE    Take 25 mg by mouth 2 (two) times daily.    PROPRANOLOL (INDERAL LA) 60 MG 24 HR CAPSULE    Take 1 capsule (60 mg total) by mouth once daily.    PROPRANOLOL (INDERAL) 10 MG TABLET    TAKE 1/2-1 TAB 3 TIMES DAILY    ROSUVASTATIN (CRESTOR) 20 MG TABLET    Take 40 mg by mouth once daily.    TRIAMCINOLONE ACETONIDE 0.1% (KENALOG) 0.1 % CREAM    triamcinolone acetonide 0.1 % topical cream   APPLY TO AFFECTED AREA TWICE A DAY TO RASH ON NECK, CHEST, & BACK. NEVER USE ON FACE   Changed and/or Refilled Medications    Modified Medication Previous Medication    FOLIC ACID-VIT B6-VIT B12 2.5-25-2 MG (FOLBIC OR EQUIV) 2.5-25-2 MG TAB folic acid-vit B6-vit B12 2.5-25-2 mg (FOLBIC OR EQUIV) 2.5-25-2 mg Tab       Take 1 tablet by mouth once daily.    Take 1 tablet by mouth once daily.        Assessment:     Encounter Diagnoses   Name Primary?    Alopecia Yes    Autoimmune skin disease     Fibromyalgia     Family history of ITP     FUAD positive     Sjogren syndrome with vasculitis     Laryngeal dystonia     Neck pain     Urticarial vasculitis     Livedo reticularis     Pyridoxine deficiency        Plan:      Encounter Diagnoses   Name " Primary?    Alopecia Yes    Autoimmune skin disease     Fibromyalgia     Family history of ITP     FUAD positive     Sjogren syndrome with vasculitis     Laryngeal dystonia     Neck pain     Urticarial vasculitis     Livedo reticularis     Pyridoxine deficiency      Heidi was seen today for disease management.    Diagnoses and all orders for this visit:    Alopecia  -     finasteride (PROPECIA) 1 mg tablet; Take 1 tablet (1 mg total) by mouth once daily.  -     Discontinue: minoxidiL (LONITEN) 2.5 MG tablet; Take 1 tablet (2.5 mg total) by mouth once daily.  -     minoxidiL (LONITEN) 2.5 MG tablet; Take 1 tablet (2.5 mg total) by mouth once daily.  -     Prealbumin; Future  -     Cancel: Ferritin; Future  -     Cancel: Iron and TIBC; Future  -     Cancel: Vitamin D; Future  -     Cancel: Zinc; Future  -     Cancel: PTH, Intact; Future  -     Cancel: Magnesium; Future  -     Cancel: Phosphorus; Future  -     Folate; Future  -     Vitamin B12; Future  -     Vitamin B6; Future  -     Vitamin B1; Future  -     Cancel: Sedimentation rate; Future  -     Cancel: C-Reactive Protein; Future  -     Cancel: Comprehensive Metabolic Panel; Future  -     Cancel: CBC Auto Differential; Future  -     Cancel: T4, Free; Future  -     Cancel: TSH; Future  -     Cancel: T3, Free; Future  -     Ferritin; Future  -     Iron and TIBC; Future  -     Vitamin D; Future  -     Zinc; Future  -     PTH, Intact; Future  -     Magnesium; Future  -     Phosphorus; Future  -     T4, Free; Future  -     TSH; Future  -     T3, Free; Future  -     Sedimentation rate; Future  -     C-Reactive Protein; Future  -     Comprehensive Metabolic Panel; Future  -     CBC Auto Differential; Future  -     Ferritin  -     Iron and TIBC  -     Vitamin D  -     Zinc  -     PTH, Intact  -     Magnesium  -     Phosphorus  -     T4, Free  -     TSH  -     T3, Free  -     Sedimentation rate  -     C-Reactive Protein  -     Comprehensive Metabolic Panel  -     CBC  Auto Differential    Autoimmune skin disease  -     folic acid-vit B6-vit B12 2.5-25-2 mg (FOLBIC OR EQUIV) 2.5-25-2 mg Tab; Take 1 tablet by mouth once daily.  -     Prealbumin; Future  -     Cancel: Ferritin; Future  -     Cancel: Iron and TIBC; Future  -     Cancel: Vitamin D; Future  -     Cancel: Zinc; Future  -     Cancel: PTH, Intact; Future  -     Cancel: Magnesium; Future  -     Cancel: Phosphorus; Future  -     Folate; Future  -     Vitamin B12; Future  -     Vitamin B6; Future  -     Vitamin B1; Future  -     Cancel: Sedimentation rate; Future  -     Cancel: C-Reactive Protein; Future  -     Cancel: Comprehensive Metabolic Panel; Future  -     Cancel: CBC Auto Differential; Future  -     Cancel: T4, Free; Future  -     Cancel: TSH; Future  -     Cancel: T3, Free; Future  -     Ferritin; Future  -     Iron and TIBC; Future  -     Vitamin D; Future  -     Zinc; Future  -     PTH, Intact; Future  -     Magnesium; Future  -     Phosphorus; Future  -     T4, Free; Future  -     TSH; Future  -     T3, Free; Future  -     Sedimentation rate; Future  -     C-Reactive Protein; Future  -     Comprehensive Metabolic Panel; Future  -     CBC Auto Differential; Future  -     Ferritin  -     Iron and TIBC  -     Vitamin D  -     Zinc  -     PTH, Intact  -     Magnesium  -     Phosphorus  -     T4, Free  -     TSH  -     T3, Free  -     Sedimentation rate  -     C-Reactive Protein  -     Comprehensive Metabolic Panel  -     CBC Auto Differential    Fibromyalgia  -     folic acid-vit B6-vit B12 2.5-25-2 mg (FOLBIC OR EQUIV) 2.5-25-2 mg Tab; Take 1 tablet by mouth once daily.  -     Prealbumin; Future  -     Cancel: Ferritin; Future  -     Cancel: Iron and TIBC; Future  -     Cancel: Vitamin D; Future  -     Cancel: Zinc; Future  -     Cancel: PTH, Intact; Future  -     Cancel: Magnesium; Future  -     Cancel: Phosphorus; Future  -     Folate; Future  -     Vitamin B12; Future  -     Vitamin B6; Future  -     Vitamin B1;  Future  -     Cancel: Sedimentation rate; Future  -     Cancel: C-Reactive Protein; Future  -     Cancel: Comprehensive Metabolic Panel; Future  -     Cancel: CBC Auto Differential; Future  -     Cancel: T4, Free; Future  -     Cancel: TSH; Future  -     Cancel: T3, Free; Future  -     Ferritin; Future  -     Iron and TIBC; Future  -     Vitamin D; Future  -     Zinc; Future  -     PTH, Intact; Future  -     Magnesium; Future  -     Phosphorus; Future  -     T4, Free; Future  -     TSH; Future  -     T3, Free; Future  -     Sedimentation rate; Future  -     C-Reactive Protein; Future  -     Comprehensive Metabolic Panel; Future  -     CBC Auto Differential; Future  -     Ferritin  -     Iron and TIBC  -     Vitamin D  -     Zinc  -     PTH, Intact  -     Magnesium  -     Phosphorus  -     T4, Free  -     TSH  -     T3, Free  -     Sedimentation rate  -     C-Reactive Protein  -     Comprehensive Metabolic Panel  -     CBC Auto Differential  -     Urinalysis; Future  -     Urine culture; Future  -     Urinalysis  -     Urine culture    Family history of ITP  -     folic acid-vit B6-vit B12 2.5-25-2 mg (FOLBIC OR EQUIV) 2.5-25-2 mg Tab; Take 1 tablet by mouth once daily.  -     Prealbumin; Future  -     Cancel: Ferritin; Future  -     Cancel: Iron and TIBC; Future  -     Cancel: Vitamin D; Future  -     Cancel: Zinc; Future  -     Cancel: PTH, Intact; Future  -     Cancel: Magnesium; Future  -     Cancel: Phosphorus; Future  -     Folate; Future  -     Vitamin B12; Future  -     Vitamin B6; Future  -     Vitamin B1; Future  -     Cancel: Sedimentation rate; Future  -     Cancel: C-Reactive Protein; Future  -     Cancel: Comprehensive Metabolic Panel; Future  -     Cancel: CBC Auto Differential; Future  -     Cancel: T4, Free; Future  -     Cancel: TSH; Future  -     Cancel: T3, Free; Future  -     dexAMETHasone injection 8 mg  -     cyanocobalamin injection 1,000 mcg  -     Ferritin; Future  -     Iron and TIBC;  Future  -     Vitamin D; Future  -     Zinc; Future  -     PTH, Intact; Future  -     Magnesium; Future  -     Phosphorus; Future  -     T4, Free; Future  -     TSH; Future  -     T3, Free; Future  -     Sedimentation rate; Future  -     C-Reactive Protein; Future  -     Comprehensive Metabolic Panel; Future  -     CBC Auto Differential; Future  -     Ferritin  -     Iron and TIBC  -     Vitamin D  -     Zinc  -     PTH, Intact  -     Magnesium  -     Phosphorus  -     T4, Free  -     TSH  -     T3, Free  -     Sedimentation rate  -     C-Reactive Protein  -     Comprehensive Metabolic Panel  -     CBC Auto Differential    FUAD positive  -     folic acid-vit B6-vit B12 2.5-25-2 mg (FOLBIC OR EQUIV) 2.5-25-2 mg Tab; Take 1 tablet by mouth once daily.  -     Prealbumin; Future  -     Cancel: Ferritin; Future  -     Cancel: Iron and TIBC; Future  -     Cancel: Vitamin D; Future  -     Cancel: Zinc; Future  -     Cancel: PTH, Intact; Future  -     Cancel: Magnesium; Future  -     Cancel: Phosphorus; Future  -     Folate; Future  -     Vitamin B12; Future  -     Vitamin B6; Future  -     Vitamin B1; Future  -     Cancel: Sedimentation rate; Future  -     Cancel: C-Reactive Protein; Future  -     Cancel: Comprehensive Metabolic Panel; Future  -     Cancel: CBC Auto Differential; Future  -     Cancel: T4, Free; Future  -     Cancel: TSH; Future  -     Cancel: T3, Free; Future  -     dexAMETHasone injection 8 mg  -     cyanocobalamin injection 1,000 mcg  -     Ferritin; Future  -     Iron and TIBC; Future  -     Vitamin D; Future  -     Zinc; Future  -     PTH, Intact; Future  -     Magnesium; Future  -     Phosphorus; Future  -     T4, Free; Future  -     TSH; Future  -     T3, Free; Future  -     Sedimentation rate; Future  -     C-Reactive Protein; Future  -     Comprehensive Metabolic Panel; Future  -     CBC Auto Differential; Future  -     Ferritin  -     Iron and TIBC  -     Vitamin D  -     Zinc  -     PTH, Intact  -      Magnesium  -     Phosphorus  -     T4, Free  -     TSH  -     T3, Free  -     Sedimentation rate  -     C-Reactive Protein  -     Comprehensive Metabolic Panel  -     CBC Auto Differential  -     Urinalysis; Future  -     Urine culture; Future  -     Urinalysis  -     Urine culture    Sjogren syndrome with vasculitis  -     folic acid-vit B6-vit B12 2.5-25-2 mg (FOLBIC OR EQUIV) 2.5-25-2 mg Tab; Take 1 tablet by mouth once daily.  -     Prealbumin; Future  -     Cancel: Ferritin; Future  -     Cancel: Iron and TIBC; Future  -     Cancel: Vitamin D; Future  -     Cancel: Zinc; Future  -     Cancel: PTH, Intact; Future  -     Cancel: Magnesium; Future  -     Cancel: Phosphorus; Future  -     Folate; Future  -     Vitamin B12; Future  -     Vitamin B6; Future  -     Vitamin B1; Future  -     Cancel: Sedimentation rate; Future  -     Cancel: C-Reactive Protein; Future  -     Cancel: Comprehensive Metabolic Panel; Future  -     Cancel: CBC Auto Differential; Future  -     Cancel: T4, Free; Future  -     Cancel: TSH; Future  -     Cancel: T3, Free; Future  -     dexAMETHasone injection 8 mg  -     cyanocobalamin injection 1,000 mcg  -     Ferritin; Future  -     Iron and TIBC; Future  -     Vitamin D; Future  -     Zinc; Future  -     PTH, Intact; Future  -     Magnesium; Future  -     Phosphorus; Future  -     T4, Free; Future  -     TSH; Future  -     T3, Free; Future  -     Sedimentation rate; Future  -     C-Reactive Protein; Future  -     Comprehensive Metabolic Panel; Future  -     CBC Auto Differential; Future  -     Ferritin  -     Iron and TIBC  -     Vitamin D  -     Zinc  -     PTH, Intact  -     Magnesium  -     Phosphorus  -     T4, Free  -     TSH  -     T3, Free  -     Sedimentation rate  -     C-Reactive Protein  -     Comprehensive Metabolic Panel  -     CBC Auto Differential  -     Urinalysis; Future  -     Urine culture; Future  -     Urinalysis  -     Urine culture    Laryngeal  dystonia  Comments:  clinical dx  Orders:  -     folic acid-vit B6-vit B12 2.5-25-2 mg (FOLBIC OR EQUIV) 2.5-25-2 mg Tab; Take 1 tablet by mouth once daily.  -     Prealbumin; Future  -     Cancel: Ferritin; Future  -     Cancel: Iron and TIBC; Future  -     Cancel: Vitamin D; Future  -     Cancel: Zinc; Future  -     Cancel: PTH, Intact; Future  -     Cancel: Magnesium; Future  -     Cancel: Phosphorus; Future  -     Folate; Future  -     Vitamin B12; Future  -     Vitamin B6; Future  -     Vitamin B1; Future  -     Cancel: Sedimentation rate; Future  -     Cancel: C-Reactive Protein; Future  -     Cancel: Comprehensive Metabolic Panel; Future  -     Cancel: CBC Auto Differential; Future  -     Cancel: T4, Free; Future  -     Cancel: TSH; Future  -     Cancel: T3, Free; Future  -     Ferritin; Future  -     Iron and TIBC; Future  -     Vitamin D; Future  -     Zinc; Future  -     PTH, Intact; Future  -     Magnesium; Future  -     Phosphorus; Future  -     T4, Free; Future  -     TSH; Future  -     T3, Free; Future  -     Sedimentation rate; Future  -     C-Reactive Protein; Future  -     Comprehensive Metabolic Panel; Future  -     CBC Auto Differential; Future  -     Ferritin  -     Iron and TIBC  -     Vitamin D  -     Zinc  -     PTH, Intact  -     Magnesium  -     Phosphorus  -     T4, Free  -     TSH  -     T3, Free  -     Sedimentation rate  -     C-Reactive Protein  -     Comprehensive Metabolic Panel  -     CBC Auto Differential  -     Urinalysis; Future  -     Urine culture; Future  -     Urinalysis  -     Urine culture    Neck pain  -     folic acid-vit B6-vit B12 2.5-25-2 mg (FOLBIC OR EQUIV) 2.5-25-2 mg Tab; Take 1 tablet by mouth once daily.  -     Prealbumin; Future  -     Cancel: Ferritin; Future  -     Cancel: Iron and TIBC; Future  -     Cancel: Vitamin D; Future  -     Cancel: Zinc; Future  -     Cancel: PTH, Intact; Future  -     Cancel: Magnesium; Future  -     Cancel: Phosphorus; Future  -      Folate; Future  -     Vitamin B12; Future  -     Vitamin B6; Future  -     Vitamin B1; Future  -     Cancel: Sedimentation rate; Future  -     Cancel: C-Reactive Protein; Future  -     Cancel: Comprehensive Metabolic Panel; Future  -     Cancel: CBC Auto Differential; Future  -     Cancel: T4, Free; Future  -     Cancel: TSH; Future  -     Cancel: T3, Free; Future  -     dexAMETHasone injection 8 mg  -     cyanocobalamin injection 1,000 mcg  -     Ferritin; Future  -     Iron and TIBC; Future  -     Vitamin D; Future  -     Zinc; Future  -     PTH, Intact; Future  -     Magnesium; Future  -     Phosphorus; Future  -     T4, Free; Future  -     TSH; Future  -     T3, Free; Future  -     Sedimentation rate; Future  -     C-Reactive Protein; Future  -     Comprehensive Metabolic Panel; Future  -     CBC Auto Differential; Future  -     Ferritin  -     Iron and TIBC  -     Vitamin D  -     Zinc  -     PTH, Intact  -     Magnesium  -     Phosphorus  -     T4, Free  -     TSH  -     T3, Free  -     Sedimentation rate  -     C-Reactive Protein  -     Comprehensive Metabolic Panel  -     CBC Auto Differential  -     Urinalysis; Future  -     Urine culture; Future  -     Urinalysis  -     Urine culture    Urticarial vasculitis  -     folic acid-vit B6-vit B12 2.5-25-2 mg (FOLBIC OR EQUIV) 2.5-25-2 mg Tab; Take 1 tablet by mouth once daily.  -     Prealbumin; Future  -     Cancel: Ferritin; Future  -     Cancel: Iron and TIBC; Future  -     Cancel: Vitamin D; Future  -     Cancel: Zinc; Future  -     Cancel: PTH, Intact; Future  -     Cancel: Magnesium; Future  -     Cancel: Phosphorus; Future  -     Folate; Future  -     Vitamin B12; Future  -     Vitamin B6; Future  -     Vitamin B1; Future  -     Cancel: Sedimentation rate; Future  -     Cancel: C-Reactive Protein; Future  -     Cancel: Comprehensive Metabolic Panel; Future  -     Cancel: CBC Auto Differential; Future  -     Cancel: T4, Free; Future  -     Cancel: TSH;  Future  -     Cancel: T3, Free; Future  -     dexAMETHasone injection 8 mg  -     cyanocobalamin injection 1,000 mcg  -     Ferritin; Future  -     Iron and TIBC; Future  -     Vitamin D; Future  -     Zinc; Future  -     PTH, Intact; Future  -     Magnesium; Future  -     Phosphorus; Future  -     T4, Free; Future  -     TSH; Future  -     T3, Free; Future  -     Sedimentation rate; Future  -     C-Reactive Protein; Future  -     Comprehensive Metabolic Panel; Future  -     CBC Auto Differential; Future  -     Ferritin  -     Iron and TIBC  -     Vitamin D  -     Zinc  -     PTH, Intact  -     Magnesium  -     Phosphorus  -     T4, Free  -     TSH  -     T3, Free  -     Sedimentation rate  -     C-Reactive Protein  -     Comprehensive Metabolic Panel  -     CBC Auto Differential  -     Urinalysis; Future  -     Urine culture; Future  -     Urinalysis  -     Urine culture    Livedo reticularis  -     folic acid-vit B6-vit B12 2.5-25-2 mg (FOLBIC OR EQUIV) 2.5-25-2 mg Tab; Take 1 tablet by mouth once daily.  -     Prealbumin; Future  -     Cancel: Ferritin; Future  -     Cancel: Iron and TIBC; Future  -     Cancel: Vitamin D; Future  -     Cancel: Zinc; Future  -     Cancel: PTH, Intact; Future  -     Cancel: Magnesium; Future  -     Cancel: Phosphorus; Future  -     Folate; Future  -     Vitamin B12; Future  -     Vitamin B6; Future  -     Vitamin B1; Future  -     Cancel: Sedimentation rate; Future  -     Cancel: C-Reactive Protein; Future  -     Cancel: Comprehensive Metabolic Panel; Future  -     Cancel: CBC Auto Differential; Future  -     Cancel: T4, Free; Future  -     Cancel: TSH; Future  -     Cancel: T3, Free; Future  -     dexAMETHasone injection 8 mg  -     cyanocobalamin injection 1,000 mcg  -     Ferritin; Future  -     Iron and TIBC; Future  -     Vitamin D; Future  -     Zinc; Future  -     PTH, Intact; Future  -     Magnesium; Future  -     Phosphorus; Future  -     T4, Free; Future  -     TSH;  Future  -     T3, Free; Future  -     Sedimentation rate; Future  -     C-Reactive Protein; Future  -     Comprehensive Metabolic Panel; Future  -     CBC Auto Differential; Future  -     Ferritin  -     Iron and TIBC  -     Vitamin D  -     Zinc  -     PTH, Intact  -     Magnesium  -     Phosphorus  -     T4, Free  -     TSH  -     T3, Free  -     Sedimentation rate  -     C-Reactive Protein  -     Comprehensive Metabolic Panel  -     CBC Auto Differential    Pyridoxine deficiency  -     folic acid-vit B6-vit B12 2.5-25-2 mg (FOLBIC OR EQUIV) 2.5-25-2 mg Tab; Take 1 tablet by mouth once daily.  -     Prealbumin; Future  -     Cancel: Ferritin; Future  -     Cancel: Iron and TIBC; Future  -     Cancel: Vitamin D; Future  -     Cancel: Zinc; Future  -     Cancel: PTH, Intact; Future  -     Cancel: Magnesium; Future  -     Cancel: Phosphorus; Future  -     Folate; Future  -     Vitamin B12; Future  -     Vitamin B6; Future  -     Vitamin B1; Future  -     Cancel: Sedimentation rate; Future  -     Cancel: C-Reactive Protein; Future  -     Cancel: Comprehensive Metabolic Panel; Future  -     Cancel: CBC Auto Differential; Future  -     Cancel: T4, Free; Future  -     Cancel: TSH; Future  -     Cancel: T3, Free; Future  -     dexAMETHasone injection 8 mg  -     cyanocobalamin injection 1,000 mcg  -     Ferritin; Future  -     Iron and TIBC; Future  -     Vitamin D; Future  -     Zinc; Future  -     PTH, Intact; Future  -     Magnesium; Future  -     Phosphorus; Future  -     T4, Free; Future  -     TSH; Future  -     T3, Free; Future  -     Sedimentation rate; Future  -     C-Reactive Protein; Future  -     Comprehensive Metabolic Panel; Future  -     CBC Auto Differential; Future  -     Ferritin  -     Iron and TIBC  -     Vitamin D  -     Zinc  -     PTH, Intact  -     Magnesium  -     Phosphorus  -     T4, Free  -     TSH  -     T3, Free  -     Sedimentation rate  -     C-Reactive Protein  -     Comprehensive  Metabolic Panel  -     CBC Auto Differential      More than 50% of the  47 minute encounter was spent face to face counseling the patient regarding current status and future plan of care as well as side effects  of the medications. All questions were answered to patient's satisfaction also includes  non-face to face time preparing to see the patient (eg, review of tests), Obtaining and/or reviewing separately obtained history, Documenting clinical information in the electronic or other health record, Independently interpreting results

## 2024-03-13 ENCOUNTER — DOCUMENTATION ONLY (OUTPATIENT)
Dept: RHEUMATOLOGY | Facility: CLINIC | Age: 69
End: 2024-03-13
Payer: MEDICARE

## 2024-05-31 LAB
25(OH)D3+25(OH)D2 SERPL-MCNC: 91 NG/ML (ref 30–100)
ALBUMIN SERPL-MCNC: 4 G/DL (ref 3.6–5.1)
ALBUMIN/GLOB SERPL: 1.3 (CALC) (ref 1–2.5)
ALP SERPL-CCNC: 85 U/L (ref 37–153)
ALT SERPL-CCNC: 17 U/L (ref 6–29)
ANA PAT SER IF-IMP: ABNORMAL
ANA SER QL IF: POSITIVE
ANA TITR SER IF: ABNORMAL TITER
APPEARANCE UR: CLEAR
AST SERPL-CCNC: 24 U/L (ref 10–35)
BACTERIA UR CULT: NORMAL
BASOPHILS # BLD AUTO: 18 CELLS/UL (ref 0–200)
BASOPHILS NFR BLD AUTO: 0.4 %
BILIRUB SERPL-MCNC: 0.4 MG/DL (ref 0.2–1.2)
BILIRUB UR QL STRIP: NEGATIVE
BUN SERPL-MCNC: 18 MG/DL (ref 7–25)
BUN/CREAT SERPL: 16 (CALC) (ref 6–22)
CALCIUM SERPL-MCNC: 9.9 MG/DL (ref 8.6–10.4)
CHLORIDE SERPL-SCNC: 106 MMOL/L (ref 98–110)
CO2 SERPL-SCNC: 28 MMOL/L (ref 20–32)
COLOR UR: YELLOW
CREAT SERPL-MCNC: 1.1 MG/DL (ref 0.6–1)
CRP SERPL-MCNC: <3 MG/L
EGFR: 54 ML/MIN/1.73M2
EOSINOPHIL # BLD AUTO: 78 CELLS/UL (ref 15–500)
EOSINOPHIL NFR BLD AUTO: 1.7 %
ERYTHROCYTE [DISTWIDTH] IN BLOOD BY AUTOMATED COUNT: 14.5 % (ref 11–15)
ERYTHROCYTE [SEDIMENTATION RATE] IN BLOOD BY WESTERGREN METHOD: 28 MM/H
FERRITIN SERPL-MCNC: 122 NG/ML (ref 16–288)
GLOBULIN SER CALC-MCNC: 3 G/DL (CALC) (ref 1.9–3.7)
GLUCOSE SERPL-MCNC: 82 MG/DL (ref 65–139)
GLUCOSE UR QL STRIP: NEGATIVE
HCT VFR BLD AUTO: 39.2 % (ref 35–45)
HGB BLD-MCNC: 12.8 G/DL (ref 11.7–15.5)
HGB UR QL STRIP: ABNORMAL
IRON SATN MFR SERPL: 27 % (CALC) (ref 16–45)
IRON SERPL-MCNC: 86 MCG/DL (ref 45–160)
KETONES UR QL STRIP: NEGATIVE
LEUKOCYTE ESTERASE UR QL STRIP: NEGATIVE
LYMPHOCYTES # BLD AUTO: 1587 CELLS/UL (ref 850–3900)
LYMPHOCYTES NFR BLD AUTO: 34.5 %
MAGNESIUM SERPL-MCNC: 2.1 MG/DL (ref 1.5–2.5)
MCH RBC QN AUTO: 28.4 PG (ref 27–33)
MCHC RBC AUTO-ENTMCNC: 32.7 G/DL (ref 32–36)
MCV RBC AUTO: 87.1 FL (ref 80–100)
MONOCYTES # BLD AUTO: 350 CELLS/UL (ref 200–950)
MONOCYTES NFR BLD AUTO: 7.6 %
NEUTROPHILS # BLD AUTO: 2567 CELLS/UL (ref 1500–7800)
NEUTROPHILS NFR BLD AUTO: 55.8 %
NITRITE UR QL STRIP: NEGATIVE
PH UR STRIP: 6 [PH] (ref 5–8)
PHOSPHATE SERPL-MCNC: 3.9 MG/DL (ref 2.1–4.3)
PLATELET # BLD AUTO: 215 THOUSAND/UL (ref 140–400)
PMV BLD REES-ECKER: 10.9 FL (ref 7.5–12.5)
POTASSIUM SERPL-SCNC: 3.6 MMOL/L (ref 3.5–5.3)
PROT SERPL-MCNC: 7 G/DL (ref 6.1–8.1)
PROT UR QL STRIP: NEGATIVE
PTH-INTACT SERPL-MCNC: 18 PG/ML (ref 16–77)
RBC # BLD AUTO: 4.5 MILLION/UL (ref 3.8–5.1)
SODIUM SERPL-SCNC: 144 MMOL/L (ref 135–146)
SP GR UR STRIP: 1.01 (ref 1–1.03)
T3FREE SERPL-MCNC: 2.9 PG/ML (ref 2.3–4.2)
T4 FREE SERPL-MCNC: 1 NG/DL (ref 0.8–1.8)
TIBC SERPL-MCNC: 320 MCG/DL (CALC) (ref 250–450)
TSH SERPL-ACNC: 1.67 MIU/L (ref 0.4–4.5)
WBC # BLD AUTO: 4.6 THOUSAND/UL (ref 3.8–10.8)
ZINC SERPL-MCNC: 68 MCG/DL (ref 60–130)

## 2024-06-26 DIAGNOSIS — F41.9 ANXIETY: ICD-10-CM

## 2024-06-28 RX ORDER — FLUOXETINE 10 MG/1
10 CAPSULE ORAL
Qty: 30 CAPSULE | Refills: 1 | Status: SHIPPED | OUTPATIENT
Start: 2024-06-28

## 2024-07-23 DIAGNOSIS — F41.9 ANXIETY: ICD-10-CM

## 2024-07-23 RX ORDER — FLUOXETINE 10 MG/1
10 CAPSULE ORAL DAILY
Qty: 30 CAPSULE | Refills: 1 | Status: SHIPPED | OUTPATIENT
Start: 2024-07-23

## 2024-10-02 ENCOUNTER — TELEPHONE (OUTPATIENT)
Dept: RHEUMATOLOGY | Facility: CLINIC | Age: 69
End: 2024-10-02
Payer: MEDICARE

## 2024-10-02 NOTE — TELEPHONE ENCOUNTER
----- Message from Radha sent at 10/2/2024 11:04 AM CDT -----  Contact: PT  Type: RESCHEDULE Appointment Request    Name of Caller:PT   When is the first available appointment? PT IS JOE FOR 10/03/24  Symptoms: 7 MO F/U   Would the patient rather a call back or a response via [facility]? CALL   Best Call Back Number:[phone]  Additional Information: PT NEEDS TO MEGAN 1-2 WEEKS OUT.   THANK YOU

## 2024-10-02 NOTE — TELEPHONE ENCOUNTER
Returned patient call and informed Dr Carbajal schedule is full until April. Offered patient a virtual visit tomorrow. Patient agreed to virtual visit for tomorrow. Nurse informed that if a cancellation becomes available before she leaves will call patient to reschedule. Verbalized understanding.

## 2024-10-03 ENCOUNTER — OFFICE VISIT (OUTPATIENT)
Dept: RHEUMATOLOGY | Facility: CLINIC | Age: 69
End: 2024-10-03
Payer: MEDICARE

## 2024-10-03 DIAGNOSIS — L93.1 SUBACUTE CUTANEOUS LUPUS ERYTHEMATOSUS: ICD-10-CM

## 2024-10-03 DIAGNOSIS — M35.0B SJOGREN SYNDROME WITH VASCULITIS: Primary | ICD-10-CM

## 2024-10-03 DIAGNOSIS — J38.3 LARYNGEAL DYSTONIA: ICD-10-CM

## 2024-10-03 DIAGNOSIS — M79.7 FIBROMYALGIA: ICD-10-CM

## 2024-10-03 DIAGNOSIS — M35.0B SJOGREN SYNDROME WITH VASCULITIS: ICD-10-CM

## 2024-10-03 DIAGNOSIS — N18.31 CHRONIC KIDNEY DISEASE, STAGE 3A: ICD-10-CM

## 2024-10-03 DIAGNOSIS — R73.09 OTHER ABNORMAL GLUCOSE: ICD-10-CM

## 2024-10-03 DIAGNOSIS — Z83.2 FAMILY HISTORY OF ITP: ICD-10-CM

## 2024-10-03 PROCEDURE — 99215 OFFICE O/P EST HI 40 MIN: CPT | Mod: 95,,, | Performed by: INTERNAL MEDICINE

## 2024-10-03 RX ORDER — HYDROCODONE BITARTRATE AND ACETAMINOPHEN 7.5; 325 MG/1; MG/1
1 TABLET ORAL EVERY 6 HOURS PRN
Qty: 28 TABLET | Refills: 0 | Status: SHIPPED | OUTPATIENT
Start: 2024-10-03 | End: 2024-10-10

## 2024-10-03 RX ORDER — DAPSONE 50 MG/G
1 GEL TOPICAL 2 TIMES DAILY
Qty: 60 G | Refills: 0 | OUTPATIENT
Start: 2024-10-03

## 2024-10-03 RX ORDER — DAPSONE 50 MG/G
GEL TOPICAL 2 TIMES DAILY
Qty: 60 G | Refills: 0 | Status: SHIPPED | OUTPATIENT
Start: 2024-10-03 | End: 2025-10-03

## 2024-10-03 RX ORDER — DAPSONE 25 MG/1
25 TABLET ORAL DAILY
Qty: 30 TABLET | Refills: 2 | Status: SHIPPED | OUTPATIENT
Start: 2024-10-03 | End: 2025-01-01

## 2024-10-03 NOTE — PROGRESS NOTES
Subjective:     Patient ID:  Heidi Romero    Chief Complaint:  Disease Management     History of Present Illness:  Pt is a 69 y.o. female with fibromyalgia and per pt she had sign of sle. 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 S She was still severe fatigue, low energy and now has a benign tremors. She sees neurology and heme/onc she is taking iron and b12 injections. Everything started in April 2022. Derm dx subcutaneous sle- dapsone and prednisone.  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    Ankylosing spondylitis of unspecified sites in spine 05/11/2023    Chronic kidney disease, stage 3a 05/11/2023    Family history of ITP 02/16/2023    Anemia, normocytic normochromic 02/16/2023    Anemia, unspecified 02/16/2023    Anemia in chronic renal disease 02/16/2023    Fibromyalgia 08/08/2022     Past Surgical History:   Procedure Laterality Date    BREAST BIOPSY Right     over 15 yrs ago benign    HYSTERECTOMY       Social History     Tobacco Use    Smoking status: Every Day     Current packs/day: 0.50     Average packs/day: 0.5 packs/day for 55.1 years (27.5 ttl pk-yrs)     Types: Cigarettes      Start date: 9/18/1969    Smokeless tobacco: Never    Tobacco comments:     Patient has smoked off/on since she was 14     No family history on file.  Review of patient's allergies indicates:   Allergen Reactions    Vistaril [hydroxyzine hcl] Swelling    Codeine      Other reaction(s): Abdominal Pain    Hydrochlorothiazide     Plaquenil [hydroxychloroquine] Hives    Triamterene        Review of Systems   Review of Systems   Constitutional:  Positive for chills and fatigue. Negative for activity change, appetite change, diaphoresis, fever and unexpected weight change.   HENT:  Negative for congestion, dental problem, ear discharge, ear pain, facial swelling, mouth sores, nosebleeds, postnasal drip, rhinorrhea, sinus pressure, sneezing, sore throat, tinnitus, trouble swallowing and voice change.    Eyes:  Negative for photophobia, pain, discharge, redness and itching.   Respiratory:  Positive for cough. Negative for apnea, chest tightness, shortness of breath and wheezing.    Cardiovascular:  Positive for leg swelling. Negative for chest pain and palpitations.   Gastrointestinal:  Positive for abdominal pain. Negative for abdominal distention, constipation, diarrhea, nausea and vomiting.   Endocrine: Negative for cold intolerance, heat intolerance, polydipsia and polyuria.   Genitourinary:  Negative for decreased urine volume, difficulty urinating, dysuria, flank pain, frequency, hematuria and urgency.   Musculoskeletal:  Positive for arthralgias, back pain, gait problem, joint swelling, myalgias, neck pain and neck stiffness.   Skin:  Negative for pallor, rash and wound.   Allergic/Immunologic: Negative for immunocompromised state.   Neurological:  Negative for dizziness, tremors, numbness and headaches.   Hematological:  Negative for adenopathy. Does not bruise/bleed easily.   Psychiatric/Behavioral:  Negative for sleep disturbance. The patient is not nervous/anxious.       Current Medications:  Current Outpatient  "Medications   Medication Instructions    ALPRAZolam (XANAX) 0.25 mg, Oral, 3 times daily    ascorbic acid (vitamin C) (VITAMIN C) 1,000 mg, Oral    aspirin 81 mg, Oral, Daily    azelastine (ASTELIN) 137 mcg, Nasal, 2 times daily    azelastine (OPTIVAR) 0.05 % ophthalmic solution 1 drop, Both Eyes, 2 times daily PRN    blunt needle, disposable 18 x 1 1/2 " Ndle 1 Syringe, Misc.(Non-Drug; Combo Route), Weekly    calcium-vitamin D3 (OS- + D3) 500 mg-5 mcg (200 unit) per tablet 1 tablet, Oral, 2 times daily with meals    carboxymethylcellulose (REFRESH PLUS) 0.5 % Dpet 1 drop, 3 times daily PRN    cetirizine (ZYRTEC) 10 mg, Oral, Daily    cholecalciferol, vitamin D3, (VITAMIN D3) 250 mcg (10,000 unit) Cap capsule as directed Orally    cyanocobalamin 1,000 mcg/mL injection INJECT 1ML INTO THE SKIN EVERY 30 DAYS    dapsone (ACZONE) 5 % topical gel Topical (Top), 2 times daily    dapsone 25 mg, Oral, Daily    diphenhydrAMINE (BENADRYL ALLERGY) 25 mg tablet 1 tablet at bedtime as needed Orally Once a day for 30 day(s)    ergocalciferol, vitamin D2, (VITAMIN D ORAL) Oral    finasteride (PROPECIA) 1 mg, Oral, Daily    FLUoxetine 10 mg, Oral, Daily    folic acid-vit B6-vit B12 2.5-25-2 mg (FOLBIC OR EQUIV) 2.5-25-2 mg Tab 1 tablet, Oral, Daily    hydrocortisone 2.5 % cream hydrocortisone 2.5 % topical cream   APPLY TO AFFECTED AREA TWICE A DAY TO RASH ON FACE AS NEEDED FOR 7 DAYS    ibuprofen (ADVIL,MOTRIN) 800 mg, Oral, Every 6 hours PRN    IRON 100 PLUS Tab 1 tablet, Oral    iron-vitamin C 100-250 mg, ICAR-C, 100-250 mg Tab 1 tablet, Oral    levothyroxine (SYNTHROID) 25 mcg, Oral, Before breakfast    minoxidiL (LONITEN) 2.5 mg, Oral, Daily    multivitamin (THERAGRAN) per tablet 1 tablet, Oral, Daily, Women's centrum    needle, disp, 30 gauge 30 gauge x 1/2" Ndle 1 each, Misc.(Non-Drug; Combo Route), Weekly    NURTEC 75 mg odt SMARTSI Tablet(s) Sublingual    ondansetron (ZOFRAN) 4 mg, Oral, Every 8 hours PRN    " ondansetron (ZOFRAN-ODT) 8 mg, Oral, Every 6 hours PRN    pilocarpine HCl (SALAGEN) 7.5 mg, Oral, 3 times daily    pregabalin (LYRICA) 25 mg, Oral, 2 times daily    propranoloL (INDERAL LA) 60 mg, Oral, Daily    propranoloL (INDERAL) 10 MG tablet TAKE 1/2-1 TAB 3 TIMES DAILY    rosuvastatin (CRESTOR) 40 mg, Oral, Daily    triamcinolone acetonide 0.1% (KENALOG) 0.1 % cream triamcinolone acetonide 0.1 % topical cream   APPLY TO AFFECTED AREA TWICE A DAY TO RASH ON NECK, CHEST, & BACK. NEVER USE ON FACE         Objective:     There were no vitals filed for this visit.   There is no height or weight on file to calculate BMI.     Physical Examinations:  Physical Exam   Constitutional: She is oriented to person, place, and time.   Neurological: She is alert and oriented to person, place, and time.   Psychiatric: Mood, affect and judgment normal.        Disease Assessment Scores:  Patient's Global Assessment of arthritis (0-10): 2  Physician's Global Assessment of arthritis (0-10): 1  Number of Tender Joints (0-28): 1  Number of Swollen Joints (0-28): 2         No data to display                Monitoring Lab Results:  Lab Results   Component Value Date    WBC 4.6 05/28/2024    RBC 4.50 05/28/2024    HGB 12.8 05/28/2024    HCT 39.2 05/28/2024    MCV 87.1 05/28/2024    MCH 28.4 05/28/2024    MCHC 32.7 05/28/2024    RDW 14.5 05/28/2024     05/28/2024        Lab Results   Component Value Date     05/28/2024    K 3.6 05/28/2024     05/28/2024    CO2 28 05/28/2024    GLU 82 05/28/2024    BUN 18 05/28/2024    CREATININE 1.10 (H) 05/28/2024    CALCIUM 9.9 05/28/2024    PROT 7.0 05/28/2024    ALBUMIN 4.0 05/28/2024    BILITOT 0.4 05/28/2024    ALKPHOS 70 03/07/2023    AST 24 05/28/2024    ALT 17 05/28/2024    ANIONGAP 10 03/07/2023    EGFRNORACEVR 54 (L) 05/28/2024       Lab Results   Component Value Date    SEDRATE 28 05/28/2024    CRP <3.0 05/28/2024        Lab Results   Component Value Date    LTCSQFKZ45 824  "06/08/2023        No results found for: "CHOL", "HDL", "LDLCALC", "TRIG"    Lab Results   Component Value Date    RF <14 06/08/2023    CCPANTIBODIE 8 08/08/2022     Lab Results   Component Value Date    DSDNA <1 06/08/2023    SMRNPAB <1.0 NEG 06/08/2023    QLK10ZE <1.0 NEG 06/08/2023     No results found for: "HLABB27"    Infectious Disease Screening:  No results found for: "HEPBSAG", "HEPBCAB", "HEPBSAB", "HEPBSURFABQU", "HEPBIGM"  No results found for: "HEPCAB", "HEPCVAB", "HCVQUANTRES"  No results found for: "TBGOLDPLUS", "QUANTTBGDPL"  No results found for: "QUANTIFERON", "SVCMT", "QUANTAGVALUE", "QUANTNILVALU", "QUANTMITOGEN", "QFTTBAG", "QINT"     Imaging:  DEXA, Xrays, MRIs, CTs, etc    Old & Outside Medical Records:  Reviewed old and all outside medical records available in Care Everywhere     Assessment:     Encounter Diagnoses   Name Primary?    Sjogren syndrome with vasculitis Yes    Fibromyalgia     Subacute cutaneous lupus erythematosus     Chronic kidney disease, stage 3a     Laryngeal dystonia     Family history of ITP     Other abnormal glucose           Plan:      Encounter Diagnoses   Name Primary?    Sjogren syndrome with vasculitis Yes    Fibromyalgia     Subacute cutaneous lupus erythematosus     Chronic kidney disease, stage 3a     Laryngeal dystonia     Family history of ITP     Other abnormal glucose      Diagnoses and all orders for this visit:    Sjogren syndrome with vasculitis  -     dapsone (ACZONE) 5 % topical gel; Apply topically 2 (two) times daily.  -     dapsone 25 MG Tab; Take 1 tablet (25 mg total) by mouth once daily.  -     HYDROcodone-acetaminophen (NORCO) 7.5-325 mg per tablet; Take 1 tablet by mouth every 6 (six) hours as needed for Pain.  -     Sedimentation rate; Future  -     C-Reactive Protein; Future  -     Comprehensive Metabolic Panel; Future  -     CBC Auto Differential; Future  -     FUAD Screen w/Reflex; Future  -     TSH; Future  -     T4, Free; Future  -     T3, " Free; Future  -     LIPID PANEL; Future  -     Hemoglobin A1C; Future  -     FUAD Screen w/Reflex  -     TSH  -     T4, Free  -     T3, Free  -     Hemoglobin A1C  -     Sedimentation rate  -     C-Reactive Protein  -     Comprehensive Metabolic Panel  -     CBC Auto Differential  -     LIPID PANEL    Fibromyalgia  -     dapsone (ACZONE) 5 % topical gel; Apply topically 2 (two) times daily.  -     dapsone 25 MG Tab; Take 1 tablet (25 mg total) by mouth once daily.  -     HYDROcodone-acetaminophen (NORCO) 7.5-325 mg per tablet; Take 1 tablet by mouth every 6 (six) hours as needed for Pain.  -     Sedimentation rate; Future  -     C-Reactive Protein; Future  -     Comprehensive Metabolic Panel; Future  -     CBC Auto Differential; Future  -     FUAD Screen w/Reflex; Future  -     TSH; Future  -     T4, Free; Future  -     T3, Free; Future  -     LIPID PANEL; Future  -     Hemoglobin A1C; Future  -     FUAD Screen w/Reflex  -     TSH  -     T4, Free  -     T3, Free  -     Hemoglobin A1C  -     Sedimentation rate  -     C-Reactive Protein  -     Comprehensive Metabolic Panel  -     CBC Auto Differential  -     LIPID PANEL    Subacute cutaneous lupus erythematosus  -     dapsone (ACZONE) 5 % topical gel; Apply topically 2 (two) times daily.  -     dapsone 25 MG Tab; Take 1 tablet (25 mg total) by mouth once daily.  -     HYDROcodone-acetaminophen (NORCO) 7.5-325 mg per tablet; Take 1 tablet by mouth every 6 (six) hours as needed for Pain.  -     Sedimentation rate; Future  -     C-Reactive Protein; Future  -     Comprehensive Metabolic Panel; Future  -     CBC Auto Differential; Future  -     UFAD Screen w/Reflex; Future  -     TSH; Future  -     T4, Free; Future  -     T3, Free; Future  -     LIPID PANEL; Future  -     Hemoglobin A1C; Future  -     FUAD Screen w/Reflex  -     TSH  -     T4, Free  -     T3, Free  -     Hemoglobin A1C  -     Sedimentation rate  -     C-Reactive Protein  -     Comprehensive Metabolic Panel  -      CBC Auto Differential  -     LIPID PANEL    Chronic kidney disease, stage 3a  -     HYDROcodone-acetaminophen (NORCO) 7.5-325 mg per tablet; Take 1 tablet by mouth every 6 (six) hours as needed for Pain.  -     Sedimentation rate; Future  -     C-Reactive Protein; Future  -     Comprehensive Metabolic Panel; Future  -     CBC Auto Differential; Future  -     FUAD Screen w/Reflex; Future  -     TSH; Future  -     T4, Free; Future  -     T3, Free; Future  -     LIPID PANEL; Future  -     Hemoglobin A1C; Future  -     FUAD Screen w/Reflex  -     TSH  -     T4, Free  -     T3, Free  -     Hemoglobin A1C  -     Sedimentation rate  -     C-Reactive Protein  -     Comprehensive Metabolic Panel  -     CBC Auto Differential  -     LIPID PANEL    Laryngeal dystonia  -     HYDROcodone-acetaminophen (NORCO) 7.5-325 mg per tablet; Take 1 tablet by mouth every 6 (six) hours as needed for Pain.  -     Sedimentation rate; Future  -     C-Reactive Protein; Future  -     Comprehensive Metabolic Panel; Future  -     CBC Auto Differential; Future  -     FUAD Screen w/Reflex; Future  -     TSH; Future  -     T4, Free; Future  -     T3, Free; Future  -     LIPID PANEL; Future  -     Hemoglobin A1C; Future  -     FUAD Screen w/Reflex  -     TSH  -     T4, Free  -     T3, Free  -     Hemoglobin A1C  -     Sedimentation rate  -     C-Reactive Protein  -     Comprehensive Metabolic Panel  -     CBC Auto Differential  -     LIPID PANEL    Family history of ITP  -     HYDROcodone-acetaminophen (NORCO) 7.5-325 mg per tablet; Take 1 tablet by mouth every 6 (six) hours as needed for Pain.  -     Sedimentation rate; Future  -     C-Reactive Protein; Future  -     Comprehensive Metabolic Panel; Future  -     CBC Auto Differential; Future  -     FUAD Screen w/Reflex; Future  -     TSH; Future  -     T4, Free; Future  -     T3, Free; Future  -     LIPID PANEL; Future  -     Hemoglobin A1C; Future  -     FUAD Screen w/Reflex  -     TSH  -     T4, Free  -      T3, Free  -     Hemoglobin A1C  -     Sedimentation rate  -     C-Reactive Protein  -     Comprehensive Metabolic Panel  -     CBC Auto Differential  -     LIPID PANEL    Other abnormal glucose  -     Hemoglobin A1C; Future  -     Hemoglobin A1C        1. Norco ordered  2. Labs ordered       Follow-up 6 months    The patient location is: home  The chief complaint leading to consultation is: sjogren's    Visit type: audiovisual    Face to Face time with patient: 40  minutes of total time spent on the encounter, which includes face to face time and non-face to face time preparing to see the patient (eg, review of tests), Obtaining and/or reviewing separately obtained history, Documenting clinical information in the electronic or other health record, Independently interpreting results (not separately reported) and communicating results to the patient/family/caregiver, or Care coordination (not separately reported).         Each patient to whom he or she provides medical services by telemedicine is:  (1) informed of the relationship between the physician and patient and the respective role of any other health care provider with respect to management of the patient; and (2) notified that he or she may decline to receive medical services by telemedicine and may withdraw from such care at any time.    Notes:

## 2024-10-23 ENCOUNTER — TELEPHONE (OUTPATIENT)
Dept: RHEUMATOLOGY | Facility: CLINIC | Age: 69
End: 2024-10-23
Payer: MEDICARE

## 2024-10-23 NOTE — TELEPHONE ENCOUNTER
----- Message from Fabiola sent at 10/23/2024 10:49 AM CDT -----  Contact: self  Type: Needs Medical Advice  Who Called:  Patient     Best Call Back Number: [phone]    Additional Information: States she need lab orders faxed over to [facility] on [address] is currently at office.Please call back

## 2024-10-26 LAB
ALBUMIN SERPL-MCNC: 3.8 G/DL (ref 3.6–5.1)
ALBUMIN/GLOB SERPL: 1.2 (CALC) (ref 1–2.5)
ALP SERPL-CCNC: 80 U/L (ref 37–153)
ALT SERPL-CCNC: 20 U/L (ref 6–29)
ANA SER QL IF: NORMAL
AST SERPL-CCNC: 25 U/L (ref 10–35)
BASOPHILS # BLD AUTO: 10 CELLS/UL (ref 0–200)
BASOPHILS NFR BLD AUTO: 0.2 %
BILIRUB SERPL-MCNC: 0.4 MG/DL (ref 0.2–1.2)
BUN SERPL-MCNC: 13 MG/DL (ref 7–25)
BUN/CREAT SERPL: NORMAL (CALC) (ref 6–22)
CALCIUM SERPL-MCNC: 9.7 MG/DL (ref 8.6–10.4)
CHLORIDE SERPL-SCNC: 105 MMOL/L (ref 98–110)
CHOLEST SERPL-MCNC: 240 MG/DL
CHOLEST/HDLC SERPL: 6.9 (CALC)
CO2 SERPL-SCNC: 28 MMOL/L (ref 20–32)
CREAT SERPL-MCNC: 0.89 MG/DL (ref 0.6–1)
CRP SERPL-MCNC: NORMAL MG/L
EGFR: 69 ML/MIN/1.73M2
EOSINOPHIL # BLD AUTO: 20 CELLS/UL (ref 15–500)
EOSINOPHIL NFR BLD AUTO: 0.4 %
ERYTHROCYTE [DISTWIDTH] IN BLOOD BY AUTOMATED COUNT: 15.8 % (ref 11–15)
ERYTHROCYTE [SEDIMENTATION RATE] IN BLOOD BY WESTERGREN METHOD: 28 MM/H
GLOBULIN SER CALC-MCNC: 3.2 G/DL (CALC) (ref 1.9–3.7)
GLUCOSE SERPL-MCNC: 99 MG/DL (ref 65–99)
HBA1C MFR BLD: 5.6 % OF TOTAL HGB
HCT VFR BLD AUTO: 38.8 % (ref 35–45)
HDLC SERPL-MCNC: 35 MG/DL
HGB BLD-MCNC: 12.2 G/DL (ref 11.7–15.5)
LDLC SERPL CALC-MCNC: 173 MG/DL (CALC)
LYMPHOCYTES # BLD AUTO: 1550 CELLS/UL (ref 850–3900)
LYMPHOCYTES NFR BLD AUTO: 30.4 %
MCH RBC QN AUTO: 27.8 PG (ref 27–33)
MCHC RBC AUTO-ENTMCNC: 31.4 G/DL (ref 32–36)
MCV RBC AUTO: 88.4 FL (ref 80–100)
MONOCYTES # BLD AUTO: 342 CELLS/UL (ref 200–950)
MONOCYTES NFR BLD AUTO: 6.7 %
NEUTROPHILS # BLD AUTO: 3177 CELLS/UL (ref 1500–7800)
NEUTROPHILS NFR BLD AUTO: 62.3 %
NONHDLC SERPL-MCNC: 205 MG/DL (CALC)
PLATELET # BLD AUTO: 215 THOUSAND/UL (ref 140–400)
PMV BLD REES-ECKER: 10.2 FL (ref 7.5–12.5)
POTASSIUM SERPL-SCNC: 3.6 MMOL/L (ref 3.5–5.3)
PROT SERPL-MCNC: 7 G/DL (ref 6.1–8.1)
RBC # BLD AUTO: 4.39 MILLION/UL (ref 3.8–5.1)
SODIUM SERPL-SCNC: 141 MMOL/L (ref 135–146)
T4 FREE SERPL-MCNC: 1 NG/DL (ref 0.8–1.8)
TRIGL SERPL-MCNC: 175 MG/DL
TSH SERPL-ACNC: 1.85 MIU/L (ref 0.4–4.5)
WBC # BLD AUTO: 5.1 THOUSAND/UL (ref 3.8–10.8)

## 2024-10-28 LAB
ALBUMIN SERPL-MCNC: 3.8 G/DL (ref 3.6–5.1)
ALBUMIN/GLOB SERPL: 1.2 (CALC) (ref 1–2.5)
ALP SERPL-CCNC: 80 U/L (ref 37–153)
ALT SERPL-CCNC: 20 U/L (ref 6–29)
ANA SER QL IF: NEGATIVE
AST SERPL-CCNC: 25 U/L (ref 10–35)
BASOPHILS # BLD AUTO: 10 CELLS/UL (ref 0–200)
BASOPHILS NFR BLD AUTO: 0.2 %
BILIRUB SERPL-MCNC: 0.4 MG/DL (ref 0.2–1.2)
BUN SERPL-MCNC: 13 MG/DL (ref 7–25)
BUN/CREAT SERPL: NORMAL (CALC) (ref 6–22)
CALCIUM SERPL-MCNC: 9.7 MG/DL (ref 8.6–10.4)
CHLORIDE SERPL-SCNC: 105 MMOL/L (ref 98–110)
CHOLEST SERPL-MCNC: 240 MG/DL
CHOLEST/HDLC SERPL: 6.9 (CALC)
CO2 SERPL-SCNC: 28 MMOL/L (ref 20–32)
CREAT SERPL-MCNC: 0.89 MG/DL (ref 0.6–1)
CRP SERPL-MCNC: 13.4 MG/L
EGFR: 69 ML/MIN/1.73M2
EOSINOPHIL # BLD AUTO: 20 CELLS/UL (ref 15–500)
EOSINOPHIL NFR BLD AUTO: 0.4 %
ERYTHROCYTE [DISTWIDTH] IN BLOOD BY AUTOMATED COUNT: 15.8 % (ref 11–15)
ERYTHROCYTE [SEDIMENTATION RATE] IN BLOOD BY WESTERGREN METHOD: 28 MM/H
GLOBULIN SER CALC-MCNC: 3.2 G/DL (CALC) (ref 1.9–3.7)
GLUCOSE SERPL-MCNC: 99 MG/DL (ref 65–99)
HBA1C MFR BLD: 5.6 % OF TOTAL HGB
HCT VFR BLD AUTO: 38.8 % (ref 35–45)
HDLC SERPL-MCNC: 35 MG/DL
HGB BLD-MCNC: 12.2 G/DL (ref 11.7–15.5)
LDLC SERPL CALC-MCNC: 173 MG/DL (CALC)
LYMPHOCYTES # BLD AUTO: 1550 CELLS/UL (ref 850–3900)
LYMPHOCYTES NFR BLD AUTO: 30.4 %
MCH RBC QN AUTO: 27.8 PG (ref 27–33)
MCHC RBC AUTO-ENTMCNC: 31.4 G/DL (ref 32–36)
MCV RBC AUTO: 88.4 FL (ref 80–100)
MONOCYTES # BLD AUTO: 342 CELLS/UL (ref 200–950)
MONOCYTES NFR BLD AUTO: 6.7 %
NEUTROPHILS # BLD AUTO: 3177 CELLS/UL (ref 1500–7800)
NEUTROPHILS NFR BLD AUTO: 62.3 %
NONHDLC SERPL-MCNC: 205 MG/DL (CALC)
PLATELET # BLD AUTO: 215 THOUSAND/UL (ref 140–400)
PMV BLD REES-ECKER: 10.2 FL (ref 7.5–12.5)
POTASSIUM SERPL-SCNC: 3.6 MMOL/L (ref 3.5–5.3)
PROT SERPL-MCNC: 7 G/DL (ref 6.1–8.1)
RBC # BLD AUTO: 4.39 MILLION/UL (ref 3.8–5.1)
SODIUM SERPL-SCNC: 141 MMOL/L (ref 135–146)
T4 FREE SERPL-MCNC: 1 NG/DL (ref 0.8–1.8)
TRIGL SERPL-MCNC: 175 MG/DL
TSH SERPL-ACNC: 1.85 MIU/L (ref 0.4–4.5)
WBC # BLD AUTO: 5.1 THOUSAND/UL (ref 3.8–10.8)

## 2024-10-29 DIAGNOSIS — E53.8 B12 DEFICIENCY: ICD-10-CM

## 2024-10-29 RX ORDER — CYANOCOBALAMIN 1000 UG/ML
INJECTION, SOLUTION INTRAMUSCULAR; SUBCUTANEOUS
Qty: 3 ML | Refills: 1 | OUTPATIENT
Start: 2024-10-29

## 2024-10-31 ENCOUNTER — TELEPHONE (OUTPATIENT)
Dept: RHEUMATOLOGY | Facility: CLINIC | Age: 69
End: 2024-10-31
Payer: MEDICARE

## 2024-10-31 DIAGNOSIS — E53.8 B12 DEFICIENCY: ICD-10-CM

## 2024-10-31 RX ORDER — CYANOCOBALAMIN 1000 UG/ML
1000 INJECTION, SOLUTION INTRAMUSCULAR; SUBCUTANEOUS
Qty: 3 ML | Refills: 0 | Status: SHIPPED | OUTPATIENT
Start: 2024-10-31

## 2024-11-08 ENCOUNTER — TELEPHONE (OUTPATIENT)
Facility: CLINIC | Age: 69
End: 2024-11-08
Payer: MEDICARE

## 2024-11-08 NOTE — TELEPHONE ENCOUNTER
I spoke with pt and reminded her to have labs done prior to appt on 11/15/24 pt verbalized understanding.

## 2024-11-11 ENCOUNTER — TELEPHONE (OUTPATIENT)
Facility: CLINIC | Age: 69
End: 2024-11-11
Payer: MEDICARE

## 2024-11-11 NOTE — TELEPHONE ENCOUNTER
Braxton from [facility] called to inquire as to which labs patient needs drawn at this time for Dr. Garcia. I instructed that she will need CBC, CMP, iron & TIBC, ferritin, folate and B12. Verbalized understanding, states she does not need new orders as patient has some in chart from March that she can use.

## 2024-11-15 ENCOUNTER — OFFICE VISIT (OUTPATIENT)
Facility: CLINIC | Age: 69
End: 2024-11-15
Payer: MEDICARE

## 2024-11-15 VITALS
HEART RATE: 100 BPM | TEMPERATURE: 97 F | WEIGHT: 161.38 LBS | OXYGEN SATURATION: 97 % | DIASTOLIC BLOOD PRESSURE: 59 MMHG | SYSTOLIC BLOOD PRESSURE: 124 MMHG | BODY MASS INDEX: 27.69 KG/M2

## 2024-11-15 DIAGNOSIS — D63.1 ANEMIA IN STAGE 3B CHRONIC KIDNEY DISEASE: Primary | ICD-10-CM

## 2024-11-15 DIAGNOSIS — N18.31 CHRONIC KIDNEY DISEASE, STAGE 3A: ICD-10-CM

## 2024-11-15 DIAGNOSIS — M35.00 SJOGREN'S SYNDROME, WITH UNSPECIFIED ORGAN INVOLVEMENT: ICD-10-CM

## 2024-11-15 DIAGNOSIS — N18.32 ANEMIA IN STAGE 3B CHRONIC KIDNEY DISEASE: Primary | ICD-10-CM

## 2024-11-15 PROCEDURE — 99999 PR PBB SHADOW E&M-EST. PATIENT-LVL II: CPT | Mod: PBBFAC,,, | Performed by: NURSE PRACTITIONER

## 2024-11-15 RX ORDER — ROSUVASTATIN CALCIUM 40 MG/1
40 TABLET, COATED ORAL
COMMUNITY
Start: 2024-06-24

## 2024-11-15 RX ORDER — PREDNISONE 5 MG/1
5 TABLET ORAL 2 TIMES DAILY
COMMUNITY

## 2024-11-15 RX ORDER — PREDNISOLONE ACETATE 10 MG/ML
1 SUSPENSION/ DROPS OPHTHALMIC 4 TIMES DAILY
COMMUNITY

## 2024-11-15 RX ORDER — PROPRANOLOL HYDROCHLORIDE 20 MG/1
20 TABLET ORAL 3 TIMES DAILY
COMMUNITY

## 2024-11-15 NOTE — PROGRESS NOTES
[facility] Hematology/Oncology  PROGRESS NOTE -  Follow-up Visit      Subjective:       Patient ID:   NAME: Heidi Romero : 1955     69 y.o. female    Referring Doc: self-referral  Other Physicians: Gilma Escobedo (PCP); Jose Padgett (Neph); VANCE Gupta (retired); Verónica Shah (derm at [place]); Sonya (Card)           Chief Complaint: fam hx/of ITP f/u     History of Present Illness:     Patient returns today for a regularly scheduled follow-up visit.  The patient is here today to go over the results of the recently ordered labs, tests and studies. She is here with her grand daughter today     She is due to see a new neurologist soon.     She saw Dr Carbajal with rheumatology and last saw her via telemedicine visit about a month ago. She is on Dapsone right now for urticaria vasculitis.     She has some residual fatigue, general malaise, that she states has been worse over the last few months. She states her stamina has decreased.     Her cholesterol panel is abnormal and she sees cardiology next week to potential due a stress test.       ROS:   GEN: general malaise; fatigue  HEENT: normal with no HA's, sore throat, stiff neck, changes in vision  CV: normal with no CP, SOB, PND, intermittent BOYD  PULM: normal with no SOB, cough, hemoptysis, sputum or pleuritic pain  GI: normal with no abdominal pain, nausea, vomiting, constipation, diarrhea, melanotic stools, BRBPR, or hematemesis  : normal with no hematuria, dysuria  BREAST: normal with no mass, discharge, pain  SKIN: no current rash breakouts      Pain Scale: 0    Allergies:  Review of patient's allergies indicates:   Allergen Reactions    Vistaril [hydroxyzine hcl] Swelling    Codeine      Other reaction(s): Abdominal Pain    Hydrochlorothiazide     Plaquenil [hydroxychloroquine] Hives    Triamterene        Medications:    Current Outpatient Medications:     ALPRAZolam (XANAX) 0.25 MG tablet, Take 1 tablet (0.25 mg total) by mouth 3 (three) times daily. for 14  days, Disp: 21 tablet, Rfl: 1    aspirin 81 MG Chew, Take 81 mg by mouth once daily., Disp: , Rfl:     calcium-vitamin D3 (OS- + D3) 500 mg-5 mcg (200 unit) per tablet, Take 1 tablet by mouth 2 (two) times daily with meals., Disp: , Rfl:     carboxymethylcellulose (REFRESH PLUS) 0.5 % Dpet, 1 drop 3 (three) times daily as needed., Disp: , Rfl:     cyanocobalamin 1,000 mcg/mL injection, Inject 1 mL (1,000 mcg total) into the skin every 30 days., Disp: 3 mL, Rfl: 0    dapsone 25 MG Tab, Take 1 tablet (25 mg total) by mouth once daily., Disp: 30 tablet, Rfl: 2    diphenhydrAMINE (BENADRYL ALLERGY) 25 mg tablet, 1 tablet at bedtime as needed Orally Once a day for 30 day(s), Disp: , Rfl:     hydrocortisone 2.5 % cream, hydrocortisone 2.5 % topical cream  APPLY TO AFFECTED AREA TWICE A DAY TO RASH ON FACE AS NEEDED FOR 7 DAYS, Disp: , Rfl:     ibuprofen (ADVIL,MOTRIN) 800 MG tablet, Take 800 mg by mouth every 6 (six) hours as needed for Pain., Disp: , Rfl:     iron-vitamin C 100-250 mg, ICAR-C, 100-250 mg Tab, TAKE 1 TABLET BY MOUTH EVERY DAY, Disp: 90 tablet, Rfl: 2    levothyroxine (SYNTHROID) 100 MCG tablet, Take 25 mcg by mouth before breakfast., Disp: , Rfl:     ondansetron (ZOFRAN-ODT) 8 MG TbDL, Take 8 mg by mouth every 6 (six) hours as needed., Disp: , Rfl:     prednisoLONE acetate (PRED FORTE) 1 % DrpS, 1 drop 4 (four) times daily., Disp: , Rfl:     predniSONE (DELTASONE) 5 MG tablet, Take 5 mg by mouth 2 (two) times daily., Disp: , Rfl:     propranoloL (INDERAL) 20 MG tablet, Take 20 mg by mouth 3 (three) times daily., Disp: , Rfl:     rosuvastatin (CRESTOR) 40 MG Tab, Take 40 mg by mouth., Disp: , Rfl:     ascorbic acid, vitamin C, (VITAMIN C) 500 MG tablet, Take 1,000 mg by mouth. (Patient not taking: Reported on 11/15/2024), Disp: , Rfl:     azelastine (ASTELIN) 137 mcg (0.1 %) nasal spray, 1 spray (137 mcg total) by Nasal route 2 (two) times daily., Disp: 30 mL, Rfl: 11    blunt needle, disposable 18  "x 1 1/2 " Ndle, 1 Syringe by Misc.(Non-Drug; Combo Route) route once a week. (Patient not taking: Reported on 11/15/2024), Disp: 25 each, Rfl: 3    cholecalciferol, vitamin D3, (VITAMIN D3) 250 mcg (10,000 unit) Cap capsule, as directed Orally (Patient not taking: Reported on 11/15/2024), Disp: , Rfl:     dapsone (ACZONE) 5 % topical gel, Apply topically 2 (two) times daily. (Patient not taking: Reported on 11/15/2024), Disp: 60 g, Rfl: 0    ergocalciferol, vitamin D2, (VITAMIN D ORAL), Take by mouth. (Patient not taking: Reported on 11/15/2024), Disp: , Rfl:     folic acid-vit B6-vit B12 2.5-25-2 mg (FOLBIC OR EQUIV) 2.5-25-2 mg Tab, Take 1 tablet by mouth once daily. (Patient not taking: Reported on 11/15/2024), Disp: 90 tablet, Rfl: 3    IRON 100 PLUS Tab, Take 1 tablet by mouth., Disp: , Rfl:     multivitamin (THERAGRAN) per tablet, Take 1 tablet by mouth once daily. Women's centrum, Disp: , Rfl:     needle, disp, 30 gauge 30 gauge x 1/2" Ndle, 1 each by Misc.(Non-Drug; Combo Route) route once a week., Disp: 25 each, Rfl: 3    NURTEC 75 mg odt, SMARTSI Tablet(s) Sublingual, Disp: , Rfl:     ondansetron (ZOFRAN) 4 MG tablet, Take 4 mg by mouth every 8 (eight) hours as needed for Nausea. (Patient not taking: Reported on 11/15/2024), Disp: , Rfl:     propranoloL (INDERAL) 10 MG tablet, TAKE 1/2-1 TAB 3 TIMES DAILY (Patient not taking: Reported on 11/15/2024), Disp: 90 tablet, Rfl: 0    triamcinolone acetonide 0.1% (KENALOG) 0.1 % cream, triamcinolone acetonide 0.1 % topical cream  APPLY TO AFFECTED AREA TWICE A DAY TO RASH ON NECK, CHEST, & BACK. NEVER USE ON FACE, Disp: , Rfl:     PMHx/PSHx Updates:  See patient's last visit with Dr. Garcia on 2023  See H&P on 2023        Pathology:   Cancer Staging   No matching staging information was found for the patient.            Objective:     Vitals:  Blood pressure (!) 124/59, pulse 100, temperature 97.2 °F (36.2 °C), temperature source Temporal, weight " 73.2 kg (161 lb 6.4 oz), SpO2 97%.    Physical Examination:   GEN: no apparent distress, comfortable; AAOx3  HEAD: atraumatic and normocephalic  EYES: no pallor, no icterus, PERRLA  ENT: OMM, no pharyngeal erythema, external ears WNL; no nasal discharge; no thrush  NECK: no masses, thyroid normal, trachea midline, no LAD/LN's, supple  CV: RRR with no murmur; normal pulse; normal S1 and S2; no pedal edema  CHEST: Normal respiratory effort; CTAB; normal breath sounds; no wheeze or crackles  ABDOM: nontender and nondistended; soft; normal bowel sounds; no rebound/guarding  MUSC/Skeletal: ROM normal; no crepitus; joints normal; no deformities or arthropathy  EXTREM: no clubbing, cyanosis, inflammation or swelling  SKIN: no rashes, lesions, ulcers, petechiae or subcutaneous nodules; no current rash issues  : no king  NEURO: grossly intact; motor/sensory WNL; AAOx3; no tremors  PSYCH: normal mood, affect and behavior;    LYMPH: normal cervical, supraclavicular, axillary and groin LN's          Labs:   Lab Results   Component Value Date    WBC 5.2 11/11/2024    HGB 13.1 11/11/2024    HCT 41.0 11/11/2024    MCV 87.2 11/11/2024     11/11/2024       CMP  Sodium   Date Value Ref Range Status   11/11/2024 138 135 - 146 mmol/L Final     Potassium   Date Value Ref Range Status   11/11/2024 4.0 3.5 - 5.3 mmol/L Final     Chloride   Date Value Ref Range Status   11/11/2024 105 98 - 110 mmol/L Final     CO2   Date Value Ref Range Status   11/11/2024 26 20 - 32 mmol/L Final     Glucose   Date Value Ref Range Status   11/11/2024 98 65 - 139 mg/dL Final     Comment:               Non-fasting reference interval          BUN   Date Value Ref Range Status   11/11/2024 25 7 - 25 mg/dL Final     Creatinine   Date Value Ref Range Status   11/11/2024 1.42 (H) 0.60 - 1.00 mg/dL Final     Calcium   Date Value Ref Range Status   11/11/2024 9.9 8.6 - 10.4 mg/dL Final     Total Protein   Date Value Ref Range Status   11/11/2024 6.8 6.1 -  8.1 g/dL Final     Albumin   Date Value Ref Range Status   11/11/2024 3.7 3.6 - 5.1 g/dL Final     Total Bilirubin   Date Value Ref Range Status   11/11/2024 0.3 0.2 - 1.2 mg/dL Final     Alkaline Phosphatase   Date Value Ref Range Status   03/07/2023 70 28 - 116 U/L Final   04/01/2022 69 38 - 145 U/L Final     AST   Date Value Ref Range Status   11/11/2024 19 10 - 35 U/L Final     ALT   Date Value Ref Range Status   11/11/2024 17 6 - 29 U/L Final     Anion Gap   Date Value Ref Range Status   03/07/2023 10 7 - 16 mmol/L Final   12/31/2022 6 (L) 8 - 16 mmol/L Final     eGFR   Date Value Ref Range Status   11/11/2024 40 (L) > OR = 60 mL/min/1.73m2 Final     Lab Results   Component Value Date    [id number] 879 11/11/2024     Lab Results   Component Value Date    FOLATE 13.8 11/11/2024         Radiology/Diagnostic Studies:    No results found.    I have reviewed all available lab results and radiology reports.    Assessment/Plan:   (1) 69 y.o. female  with diagnosis of family history of ITP (sister) who has been self-referred for evaluation by medical hematology/oncology.      - check up to date CBC/Plat  - CRP, sed rate; B12/folate, vit D, and SPEP  - consider leukemia screen pending the above results    3/23/2023:  - her platelets are currently WNL; WBC is adequate    9/19/2023:  - latest CBC is WNL    11/15/2024:   - cbc normal           (2) Mild anemia - NCNC parameters - check iron panel, B12/folate, SPEP     9/19/2023  - latest hgb wnl  - b12/folate adequate  - iron panel wnl    11/15/2024;   - iron panel is stable          (3) COPD and chronic active smoker     (4) CAD and Hypercholesterolemia     (5) CKD - followed by Dr Jose Padgett     11/15/2024:   - creatinine increased, she is feeling a lot of fatigue   - needs to increase hydration     (6) Fibromyalgia/FUAD positive/lupus - followed by Dr GILBERT Gupta in past with rheum     (7) Chronic HA's, balance issues, shakes - ? Neurological disorder - followed by  neurology         VISIT DIAGNOSES:      Anemia in stage 3b chronic kidney disease    Chronic kidney disease, stage 3a    Sjogren's syndrome, with unspecified organ involvement      PLAN:  Check cbc/plat every 3 months; continue B12 monthly and ICAR-C daily;   F/u with neurology and vascular  F/u with rheumatologist  F/u with cardiology, dermatology, etc       RTC in  6 months with Dr. Garcia     Fax note to Dayron Escobedo Pressor, Laura Williams; Felix Wyman       Discussion:   COVID-19 Discussion:     I had long discussion with patient and any applicable family about the COVID-19 coronavirus epidemic and the recommended precautions with regard to cancer and/or hematology patients. I have re-iterated the CDC recommendations for adequate hand washing, use of hand -like products, and coughing into elbow, etc. In addition, especially for our patients who are on chemotherapy and/or our otherwise immunocompromised patients, I have recommended avoidance of crowds, including movie theaters, restaurants, churches, etc. I have recommended avoidance of any sick or symptomatic family members and/or friends. I have also recommended avoidance of any raw and unwashed food products, and general avoidance of food items that have not been prepared by themselves. The patient has been asked to call us immediately with any symptom developments, issues, questions or other general concerns.        I have explained all of the above in detail and the patient understands all of the current recommendation(s). I have answered all of their questions to the best of my ability and to their complete satisfaction.   The patient is to continue with the current management plan.            Electronically signed by Yessica Valles NP

## 2024-11-29 ENCOUNTER — PATIENT MESSAGE (OUTPATIENT)
Dept: RHEUMATOLOGY | Facility: CLINIC | Age: 69
End: 2024-11-29
Payer: MEDICARE

## 2024-12-29 DIAGNOSIS — L93.1 SUBACUTE CUTANEOUS LUPUS ERYTHEMATOSUS: ICD-10-CM

## 2024-12-29 DIAGNOSIS — M35.0B SJOGREN SYNDROME WITH VASCULITIS: ICD-10-CM

## 2024-12-29 DIAGNOSIS — M79.7 FIBROMYALGIA: ICD-10-CM

## 2025-01-03 RX ORDER — DAPSONE 25 MG/1
25 TABLET ORAL
Qty: 90 TABLET | Refills: 3 | Status: SHIPPED | OUTPATIENT
Start: 2025-01-03

## 2025-01-26 DIAGNOSIS — E53.8 B12 DEFICIENCY: ICD-10-CM

## 2025-01-27 DIAGNOSIS — G89.4 CHRONIC PAIN SYNDROME: ICD-10-CM

## 2025-01-27 DIAGNOSIS — M35.0B SJOGREN SYNDROME WITH VASCULITIS: Primary | ICD-10-CM

## 2025-01-27 DIAGNOSIS — Z79.899 HIGH RISK MEDICATION USE: ICD-10-CM

## 2025-01-27 RX ORDER — CYANOCOBALAMIN 1000 UG/ML
1000 INJECTION, SOLUTION INTRAMUSCULAR; SUBCUTANEOUS
Qty: 3 ML | Refills: 0 | Status: SHIPPED | OUTPATIENT
Start: 2025-01-27

## 2025-01-29 ENCOUNTER — PATIENT MESSAGE (OUTPATIENT)
Dept: RHEUMATOLOGY | Facility: CLINIC | Age: 70
End: 2025-01-29
Payer: MEDICARE

## 2025-02-04 RX ORDER — HYDROCODONE BITARTRATE AND ACETAMINOPHEN 7.5; 325 MG/1; MG/1
1 TABLET ORAL EVERY 6 HOURS PRN
Qty: 90 TABLET | Refills: 0 | Status: SHIPPED | OUTPATIENT
Start: 2025-02-04

## 2025-04-24 ENCOUNTER — TELEPHONE (OUTPATIENT)
Facility: CLINIC | Age: 70
End: 2025-04-24
Payer: MEDICARE

## 2025-04-24 DIAGNOSIS — Z83.2 FAMILY HISTORY OF ITP: ICD-10-CM

## 2025-04-24 DIAGNOSIS — D51.9 ANEMIA DUE TO VITAMIN B12 DEFICIENCY, UNSPECIFIED B12 DEFICIENCY TYPE: ICD-10-CM

## 2025-04-24 DIAGNOSIS — E53.8 B12 DEFICIENCY: Primary | ICD-10-CM

## 2025-04-24 DIAGNOSIS — D50.9 IRON DEFICIENCY ANEMIA, UNSPECIFIED IRON DEFICIENCY ANEMIA TYPE: ICD-10-CM

## 2025-06-04 ENCOUNTER — OFFICE VISIT (OUTPATIENT)
Dept: RHEUMATOLOGY | Facility: CLINIC | Age: 70
End: 2025-06-04
Payer: MEDICARE

## 2025-06-04 VITALS
HEIGHT: 64 IN | SYSTOLIC BLOOD PRESSURE: 89 MMHG | HEART RATE: 86 BPM | DIASTOLIC BLOOD PRESSURE: 65 MMHG | BODY MASS INDEX: 27.55 KG/M2 | WEIGHT: 161.38 LBS

## 2025-06-04 DIAGNOSIS — L93.1 SUBACUTE CUTANEOUS LUPUS ERYTHEMATOSUS: Primary | ICD-10-CM

## 2025-06-04 DIAGNOSIS — Z79.899 HIGH RISK MEDICATION USE: ICD-10-CM

## 2025-06-04 DIAGNOSIS — G89.4 CHRONIC PAIN SYNDROME: ICD-10-CM

## 2025-06-04 DIAGNOSIS — R71.8 OTHER ABNORMALITY OF RED BLOOD CELLS: ICD-10-CM

## 2025-06-04 DIAGNOSIS — L95.8 URTICARIAL VASCULITIS: ICD-10-CM

## 2025-06-04 DIAGNOSIS — M35.0B SJOGREN SYNDROME WITH VASCULITIS: ICD-10-CM

## 2025-06-04 PROCEDURE — 99999 PR PBB SHADOW E&M-EST. PATIENT-LVL III: CPT | Mod: PBBFAC,,, | Performed by: INTERNAL MEDICINE

## 2025-06-04 PROCEDURE — 1160F RVW MEDS BY RX/DR IN RCRD: CPT | Mod: CPTII,S$GLB,, | Performed by: INTERNAL MEDICINE

## 2025-06-04 PROCEDURE — 96372 THER/PROPH/DIAG INJ SC/IM: CPT | Mod: S$GLB,,, | Performed by: INTERNAL MEDICINE

## 2025-06-04 PROCEDURE — 3078F DIAST BP <80 MM HG: CPT | Mod: CPTII,S$GLB,, | Performed by: INTERNAL MEDICINE

## 2025-06-04 PROCEDURE — 1125F AMNT PAIN NOTED PAIN PRSNT: CPT | Mod: CPTII,S$GLB,, | Performed by: INTERNAL MEDICINE

## 2025-06-04 PROCEDURE — 99215 OFFICE O/P EST HI 40 MIN: CPT | Mod: 25,S$GLB,, | Performed by: INTERNAL MEDICINE

## 2025-06-04 PROCEDURE — 1159F MED LIST DOCD IN RCRD: CPT | Mod: CPTII,S$GLB,, | Performed by: INTERNAL MEDICINE

## 2025-06-04 PROCEDURE — 3074F SYST BP LT 130 MM HG: CPT | Mod: CPTII,S$GLB,, | Performed by: INTERNAL MEDICINE

## 2025-06-04 PROCEDURE — 3008F BODY MASS INDEX DOCD: CPT | Mod: CPTII,S$GLB,, | Performed by: INTERNAL MEDICINE

## 2025-06-04 PROCEDURE — 1101F PT FALLS ASSESS-DOCD LE1/YR: CPT | Mod: CPTII,S$GLB,, | Performed by: INTERNAL MEDICINE

## 2025-06-04 PROCEDURE — 3288F FALL RISK ASSESSMENT DOCD: CPT | Mod: CPTII,S$GLB,, | Performed by: INTERNAL MEDICINE

## 2025-06-04 RX ORDER — CYANOCOBALAMIN 1000 UG/ML
1000 INJECTION, SOLUTION INTRAMUSCULAR; SUBCUTANEOUS
Status: COMPLETED | OUTPATIENT
Start: 2025-06-04 | End: 2025-06-04

## 2025-06-04 RX ORDER — METHYLPREDNISOLONE ACETATE 80 MG/ML
80 INJECTION, SUSPENSION INTRA-ARTICULAR; INTRALESIONAL; INTRAMUSCULAR; SOFT TISSUE
Status: COMPLETED | OUTPATIENT
Start: 2025-06-04 | End: 2025-06-04

## 2025-06-04 RX ORDER — PREDNISONE 5 MG/1
5 TABLET ORAL DAILY
Qty: 90 TABLET | Refills: 2 | Status: SHIPPED | OUTPATIENT
Start: 2025-06-04

## 2025-06-04 RX ORDER — PREDNISONE 1 MG/1
4 TABLET ORAL DAILY PRN
Qty: 120 TABLET | Refills: 1 | Status: SHIPPED | OUTPATIENT
Start: 2025-06-04

## 2025-06-04 RX ORDER — HYDROCODONE BITARTRATE AND ACETAMINOPHEN 10; 325 MG/1; MG/1
1 TABLET ORAL EVERY 8 HOURS PRN
Qty: 90 TABLET | Refills: 0 | Status: SHIPPED | OUTPATIENT
Start: 2025-06-04 | End: 2025-07-04

## 2025-06-04 RX ORDER — DAPSONE 25 MG/1
25 TABLET ORAL DAILY
Qty: 90 TABLET | Refills: 2 | Status: SHIPPED | OUTPATIENT
Start: 2025-06-04

## 2025-06-04 RX ADMIN — METHYLPREDNISOLONE ACETATE 80 MG: 80 INJECTION, SUSPENSION INTRA-ARTICULAR; INTRALESIONAL; INTRAMUSCULAR; SOFT TISSUE at 12:06

## 2025-06-04 RX ADMIN — CYANOCOBALAMIN 1000 MCG: 1000 INJECTION, SOLUTION INTRAMUSCULAR; SUBCUTANEOUS at 12:06

## 2025-06-04 NOTE — PROGRESS NOTES
2-patient identifiers confirmed, allergies reviewed, injection education discussed with patient.  Injections tolerated well with no C/O of pain or complications, see MAR for injection information.  Patient exited room ambulatory in stable condition.  ABBEY Flores LPN

## 2025-06-04 NOTE — PROGRESS NOTES
Subjective:     Patient ID:  Heidi Romero    Chief Complaint:  Disease Management     History of Present Illness:  Pt is a 70 y.o. female  fibromyalgia and per pt she had sign of sle. 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 S She was still severe fatigue, low energy and now has a benign tremors. She sees neurology and heme/onc she is taking iron and b12 injections. Everything started in April 2022. Derm dx subcutaneous sle- dapsone and prednisone.  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  Review of patient's allergies indicates:   Allergen Reactions    Vistaril [hydroxyzine hcl] Swelling    Codeine      Other reaction(s): Abdominal Pain    Hydrochlorothiazide     Plaquenil [hydroxychloroquine] Hives    Triamterene        Review of Systems   Review of Systems  "  Constitutional:  Positive for chills and fatigue. Negative for activity change, appetite change, diaphoresis, fever and unexpected weight change.   HENT:  Negative for congestion, dental problem, ear discharge, ear pain, facial swelling, mouth sores, nosebleeds, postnasal drip, rhinorrhea, sinus pressure, sneezing, sore throat, tinnitus, trouble swallowing and voice change.    Eyes:  Negative for photophobia, pain, discharge, redness and itching.   Respiratory:  Positive for cough. Negative for apnea, chest tightness, shortness of breath and wheezing.    Cardiovascular:  Positive for leg swelling. Negative for chest pain and palpitations.   Gastrointestinal:  Positive for abdominal pain. Negative for abdominal distention, constipation, diarrhea, nausea and vomiting.   Endocrine: Negative for cold intolerance, heat intolerance, polydipsia and polyuria.   Genitourinary:  Negative for decreased urine volume, difficulty urinating, dysuria, flank pain, frequency, hematuria and urgency.   Musculoskeletal:  Positive for arthralgias, back pain, gait problem, joint swelling, myalgias, neck pain and neck stiffness.   Skin:  Negative for pallor, rash and wound.   Allergic/Immunologic: Negative for immunocompromised state.   Neurological:  Negative for dizziness, tremors, numbness and headaches.   Hematological:  Negative for adenopathy. Does not bruise/bleed easily.   Psychiatric/Behavioral:  Negative for sleep disturbance. The patient is not nervous/anxious.       Current Medications:  Current Outpatient Medications   Medication Instructions    ALPRAZolam (XANAX) 0.25 mg, Oral, 3 times daily    ascorbic acid (vitamin C) (VITAMIN C) 1,000 mg    aspirin 81 mg, Daily    azelastine (ASTELIN) 137 mcg, Nasal, 2 times daily    blunt needle, disposable 18 x 1 1/2 " Ndle 1 Syringe, Misc.(Non-Drug; Combo Route), Weekly    calcium-vitamin D3 (OS- + D3) 500 mg-5 mcg (200 unit) per tablet 1 tablet, 2 times daily with meals    " "carboxymethylcellulose (REFRESH PLUS) 0.5 % Dpet 1 drop, 3 times daily PRN    cholecalciferol, vitamin D3, (VITAMIN D3) 250 mcg (10,000 unit) Cap capsule as directed Orally    cyanocobalamin 1,000 mcg, Subcutaneous, Every 30 days    dapsone (ACZONE) 5 % topical gel Topical (Top), 2 times daily    dapsone 25 mg, Oral    dapsone 25 mg, Oral, Daily    diphenhydrAMINE (BENADRYL ALLERGY) 25 mg tablet 1 tablet at bedtime as needed Orally Once a day for 30 day(s)    ergocalciferol, vitamin D2, (VITAMIN D ORAL) Take by mouth.    HYDROcodone-acetaminophen (NORCO)  mg per tablet 1 tablet, Oral, Every 8 hours PRN    HYDROcodone-acetaminophen (NORCO) 7.5-325 mg per tablet 1 tablet, Oral, Every 6 hours PRN    hydrocortisone 2.5 % cream hydrocortisone 2.5 % topical cream   APPLY TO AFFECTED AREA TWICE A DAY TO RASH ON FACE AS NEEDED FOR 7 DAYS    ibuprofen (ADVIL,MOTRIN) 800 mg, Every 6 hours PRN    IRON 100 PLUS Tab 1 tablet    iron-vitamin C 100-250 mg, ICAR-C, 100-250 mg Tab 1 tablet, Oral    levothyroxine (SYNTHROID) 25 mcg, Before breakfast    multivitamin (THERAGRAN) per tablet 1 tablet, Daily    needle, disp, 30 gauge 30 gauge x 1/2" Ndle 1 each, Misc.(Non-Drug; Combo Route), Weekly    NURTEC 75 mg odt SMARTSI Tablet(s) Sublingual    ondansetron (ZOFRAN) 4 mg, Every 8 hours PRN    ondansetron (ZOFRAN-ODT) 8 mg, Every 6 hours PRN    prednisoLONE acetate (PRED FORTE) 1 % DrpS 1 drop, 4 times daily    predniSONE (DELTASONE) 5 mg, 2 times daily    propranoloL (INDERAL) 10 MG tablet TAKE 1/2-1 TAB 3 TIMES DAILY    propranoloL (INDERAL) 20 mg, 3 times daily    rosuvastatin (CRESTOR) 40 mg    triamcinolone acetonide 0.1% (KENALOG) 0.1 % cream triamcinolone acetonide 0.1 % topical cream   APPLY TO AFFECTED AREA TWICE A DAY TO RASH ON NECK, CHEST, & BACK. NEVER USE ON FACE         Objective:     Vitals:    06/04/25 1105   BP: (!) 89/65   Pulse: 86   Weight: 73.2 kg (161 lb 6 oz)   Height: 5' 4.02" (1.626 m)   PainSc:   5 "      Body mass index is 27.69 kg/m².     Physical Examinations:  Physical Exam   Constitutional: She is oriented to person, place, and time. No distress.   HENT:   Head: Normocephalic and atraumatic.   Eyes: Pupils are equal, round, and reactive to light. Right eye exhibits no discharge. Left eye exhibits no discharge.   Neck: No thyromegaly present.   Cardiovascular: Normal rate, regular rhythm and normal heart sounds. Exam reveals no gallop and no friction rub.   No murmur heard.  Pulmonary/Chest: Breath sounds normal. She has no wheezes. She has no rales. She exhibits no tenderness.   Abdominal: There is no abdominal tenderness. There is no rebound and no guarding.   Musculoskeletal:         General: Tenderness and deformity present.      Right shoulder: Tenderness present.      Left shoulder: Tenderness present.      Right elbow: Normal.      Left elbow: Tenderness present.      Right wrist: Tenderness present.      Left wrist: Tenderness present.      Cervical back: Neck supple.      Right knee: Effusion present. Tenderness present.      Left knee: Effusion present. Tenderness present.   Lymphadenopathy:     She has no cervical adenopathy.   Neurological: She is alert and oriented to person, place, and time. Gait normal.   Skin: Skin is dry. No rash noted. No erythema. There is pallor.   Psychiatric: Mood and affect normal.   Vitals reviewed.      Right Side Rheumatological Exam     Examination finds the elbow normal.    The patient is tender to palpation of the shoulder, wrist, knee, 1st PIP, 1st MCP, 2nd PIP, 2nd MCP, 3rd PIP, 3rd MCP, 4th PIP, 4th MCP, 5th PIP and 5th MCP    She has swelling of the 1st PIP, 1st MCP, 2nd PIP, 2nd MCP, 3rd PIP, 3rd MCP, 4th PIP, 4th MCP, 5th PIP and 5th MCP    Shoulder Exam   Tenderness Location: no tenderness    Range of Motion   Active abduction:  abnormal   Adduction: abnormal  Sensation: normal    Knee Exam   Patellofemoral Crepitus: positive  Effusion:  positive  Sensation: normal    Hip Exam   Tenderness Location: posterior  Sensation: normal    Elbow/Wrist Exam   Tenderness Location: no tenderness  Sensation: normal    Left Side Rheumatological Exam     The patient is tender to palpation of the shoulder, elbow, wrist, knee, 1st PIP, 1st MCP, 2nd PIP, 2nd MCP, 3rd PIP, 3rd MCP, 4th PIP, 4th MCP, 5th PIP and 5th MCP.    She has swelling of the 1st PIP, 1st MCP, 2nd PIP, 2nd MCP, 3rd PIP, 3rd MCP, 4th PIP, 4th MCP, 5th PIP and 5th MCP    Shoulder Exam   Tenderness Location: no tenderness    Range of Motion   Active abduction:  abnormal   Sensation: normal    Knee Exam     Patellofemoral Crepitus: positive  Effusion: positive  Sensation: normal    Hip Exam   Tenderness Location: posterior  Sensation: normal    Elbow/Wrist Exam   Sensation: normal      Back/Neck Exam   General Inspection   Gait: normal          Disease Assessment Scores:  Patient's Global Assessment of arthritis (0-10): 1  Physician's Global Assessment of arthritis (0-10): 1  Number of Tender Joints (0-28): 1  Number of Swollen Joints (0-28): 1         No data to display                Monitoring Lab Results:  Lab Results   Component Value Date    WBC 5.2 11/11/2024    RBC 4.70 11/11/2024    HGB 13.1 11/11/2024    HCT 41.0 11/11/2024    MCV 87.2 11/11/2024    MCH 27.9 11/11/2024    MCHC 32.0 11/11/2024    RDW 15.7 (H) 11/11/2024     11/11/2024        Lab Results   Component Value Date     11/11/2024    K 4.0 11/11/2024     11/11/2024    CO2 26 11/11/2024    GLU 98 11/11/2024    BUN 25 11/11/2024    CREATININE 1.42 (H) 11/11/2024    CALCIUM 9.9 11/11/2024    PROT 6.8 11/11/2024    ALBUMIN 3.7 11/11/2024    BILITOT 0.3 11/11/2024    ALKPHOS 70 03/07/2023    AST 19 11/11/2024    ALT 17 11/11/2024    ANIONGAP 10 03/07/2023    EGFRNORACEVR 40 (L) 11/11/2024       Lab Results   Component Value Date    SEDRATE 28 10/25/2024    CRP 13.4 (H) 10/25/2024        Lab Results   Component Value  "Date    XENAGKUI96 879 11/11/2024        Lab Results   Component Value Date    CHOL 240 (H) 10/25/2024    HDL 35 (L) 10/25/2024    LDLCALC 173 (H) 10/25/2024    TRIG 175 (H) 10/25/2024       Lab Results   Component Value Date    RF <14 06/08/2023    CCPANTIBODIE 8 08/08/2022     Lab Results   Component Value Date    DSDNA <1 06/08/2023    SMRNPAB <1.0 NEG 06/08/2023    PDY73TC <1.0 NEG 06/08/2023     No results found for: "HLABB27"    Infectious Disease Screening:  No results found for: "HEPBSAG", "HEPBCAB", "HEPBSAB", "HEPBSURFABQU", "HEPBIGM"  No results found for: "HEPCAB", "HEPCVAB", "HCVQUANTRES"  No results found for: "TBGOLDPLUS", "QUANTTBGDPL"  No results found for: "QUANTIFERON", "SVCMT", "QUANTAGVALUE", "QUANTNILVALU", "QUANTMITOGEN", "QFTTBAG", "QINT"     Imaging:  DEXA, Xrays, MRIs, CTs, etc    Old & Outside Medical Records:  Reviewed old and all outside medical records available in Care Everywhere     Assessment:     Encounter Diagnoses   Name Primary?    Subacute cutaneous lupus erythematosus Yes    Sjogren syndrome with vasculitis     High risk medication use     Chronic pain syndrome     Urticarial vasculitis     Other abnormality of red blood cells        Plan:      Encounter Diagnoses   Name Primary?    Sjogren syndrome with vasculitis     High risk medication use     Chronic pain syndrome     Subacute cutaneous lupus erythematosus Yes    Urticarial vasculitis     Other abnormality of red blood cells    Subacute cutaneous lupus erythematosus  -     cyanocobalamin injection 1,000 mcg  -     methylPREDNISolone acetate injection 80 mg  -     dapsone 25 MG Tab; Take 1 tablet (25 mg total) by mouth once daily.  Dispense: 90 tablet; Refill: 2  -     HYDROcodone-acetaminophen (NORCO)  mg per tablet; Take 1 tablet by mouth every 8 (eight) hours as needed for Pain.  Dispense: 90 tablet; Refill: 0  -     Transferrin; Future; Expected date: 06/04/2025  -     Iron and TIBC; Future; Expected date: " 06/04/2025  -     Sedimentation rate; Future; Expected date: 06/04/2025  -     Sjogrens syndrome-B extractable nuclear antibody; Future; Expected date: 06/04/2025  -     Sjogrens syndrome-A extractable nuclear antibody; Future; Expected date: 06/04/2025  -     Comprehensive Metabolic Panel; Future; Expected date: 06/04/2025  -     CBC Auto Differential; Future; Expected date: 06/04/2025  -     Anti-Smith Antibody; Future; Expected date: 06/04/2025  -     Anti-Scleroderma Antibody; Future; Expected date: 06/04/2025  -     Anti-Histone Antibody; Future; Expected date: 06/04/2025  -     Anti-DNA Ab, Double-Stranded; Future; Expected date: 06/04/2025  -     Anti Sm/RNP Antibody; Future; Expected date: 06/04/2025  -     FUAD Screen w/Reflex; Future; Expected date: 06/04/2025  -     Urinalysis; Future; Expected date: 06/04/2025  -     T4, Free; Future; Expected date: 06/04/2025  -     TSH; Future; Expected date: 06/04/2025  -     T3, Free; Future; Expected date: 06/04/2025  -     predniSONE (DELTASONE) 5 MG tablet; Take 1 tablet (5 mg total) by mouth once daily.  Dispense: 90 tablet; Refill: 2  -     predniSONE (DELTASONE) 1 MG tablet; Take 4 tablets (4 mg total) by mouth daily as needed (flare).  Dispense: 120 tablet; Refill: 1    Sjogren syndrome with vasculitis  -     cyanocobalamin injection 1,000 mcg  -     methylPREDNISolone acetate injection 80 mg  -     dapsone 25 MG Tab; Take 1 tablet (25 mg total) by mouth once daily.  Dispense: 90 tablet; Refill: 2  -     HYDROcodone-acetaminophen (NORCO)  mg per tablet; Take 1 tablet by mouth every 8 (eight) hours as needed for Pain.  Dispense: 90 tablet; Refill: 0  -     Transferrin; Future; Expected date: 06/04/2025  -     Iron and TIBC; Future; Expected date: 06/04/2025  -     Sedimentation rate; Future; Expected date: 06/04/2025  -     Sjogrens syndrome-B extractable nuclear antibody; Future; Expected date: 06/04/2025  -     Sjogrens syndrome-A extractable nuclear  antibody; Future; Expected date: 06/04/2025  -     Comprehensive Metabolic Panel; Future; Expected date: 06/04/2025  -     CBC Auto Differential; Future; Expected date: 06/04/2025  -     Anti-Smith Antibody; Future; Expected date: 06/04/2025  -     Anti-Scleroderma Antibody; Future; Expected date: 06/04/2025  -     Anti-Histone Antibody; Future; Expected date: 06/04/2025  -     Anti-DNA Ab, Double-Stranded; Future; Expected date: 06/04/2025  -     Anti Sm/RNP Antibody; Future; Expected date: 06/04/2025  -     FUAD Screen w/Reflex; Future; Expected date: 06/04/2025  -     Urinalysis; Future; Expected date: 06/04/2025  -     T4, Free; Future; Expected date: 06/04/2025  -     TSH; Future; Expected date: 06/04/2025  -     T3, Free; Future; Expected date: 06/04/2025  -     predniSONE (DELTASONE) 5 MG tablet; Take 1 tablet (5 mg total) by mouth once daily.  Dispense: 90 tablet; Refill: 2  -     predniSONE (DELTASONE) 1 MG tablet; Take 4 tablets (4 mg total) by mouth daily as needed (flare).  Dispense: 120 tablet; Refill: 1    High risk medication use  -     cyanocobalamin injection 1,000 mcg  -     methylPREDNISolone acetate injection 80 mg  -     dapsone 25 MG Tab; Take 1 tablet (25 mg total) by mouth once daily.  Dispense: 90 tablet; Refill: 2  -     HYDROcodone-acetaminophen (NORCO)  mg per tablet; Take 1 tablet by mouth every 8 (eight) hours as needed for Pain.  Dispense: 90 tablet; Refill: 0    Chronic pain syndrome  -     cyanocobalamin injection 1,000 mcg  -     methylPREDNISolone acetate injection 80 mg  -     dapsone 25 MG Tab; Take 1 tablet (25 mg total) by mouth once daily.  Dispense: 90 tablet; Refill: 2  -     HYDROcodone-acetaminophen (NORCO)  mg per tablet; Take 1 tablet by mouth every 8 (eight) hours as needed for Pain.  Dispense: 90 tablet; Refill: 0    Urticarial vasculitis  -     cyanocobalamin injection 1,000 mcg  -     methylPREDNISolone acetate injection 80 mg  -     dapsone 25 MG Tab;  Take 1 tablet (25 mg total) by mouth once daily.  Dispense: 90 tablet; Refill: 2  -     HYDROcodone-acetaminophen (NORCO)  mg per tablet; Take 1 tablet by mouth every 8 (eight) hours as needed for Pain.  Dispense: 90 tablet; Refill: 0  -     Transferrin; Future; Expected date: 06/04/2025  -     Iron and TIBC; Future; Expected date: 06/04/2025  -     Sedimentation rate; Future; Expected date: 06/04/2025  -     Sjogrens syndrome-B extractable nuclear antibody; Future; Expected date: 06/04/2025  -     Sjogrens syndrome-A extractable nuclear antibody; Future; Expected date: 06/04/2025  -     Comprehensive Metabolic Panel; Future; Expected date: 06/04/2025  -     CBC Auto Differential; Future; Expected date: 06/04/2025  -     Anti-Smith Antibody; Future; Expected date: 06/04/2025  -     Anti-Scleroderma Antibody; Future; Expected date: 06/04/2025  -     Anti-Histone Antibody; Future; Expected date: 06/04/2025  -     Anti-DNA Ab, Double-Stranded; Future; Expected date: 06/04/2025  -     Anti Sm/RNP Antibody; Future; Expected date: 06/04/2025  -     FUAD Screen w/Reflex; Future; Expected date: 06/04/2025  -     Urinalysis; Future; Expected date: 06/04/2025  -     T4, Free; Future; Expected date: 06/04/2025  -     TSH; Future; Expected date: 06/04/2025  -     T3, Free; Future; Expected date: 06/04/2025  -     predniSONE (DELTASONE) 5 MG tablet; Take 1 tablet (5 mg total) by mouth once daily.  Dispense: 90 tablet; Refill: 2  -     predniSONE (DELTASONE) 1 MG tablet; Take 4 tablets (4 mg total) by mouth daily as needed (flare).  Dispense: 120 tablet; Refill: 1    Other abnormality of red blood cells  -     Transferrin; Future; Expected date: 06/04/2025  -     Iron and TIBC; Future; Expected date: 06/04/2025  -     Sedimentation rate; Future; Expected date: 06/04/2025  -     Sjogrens syndrome-B extractable nuclear antibody; Future; Expected date: 06/04/2025  -     Sjogrens syndrome-A extractable nuclear antibody; Future;  Expected date: 06/04/2025  -     Comprehensive Metabolic Panel; Future; Expected date: 06/04/2025  -     CBC Auto Differential; Future; Expected date: 06/04/2025  -     Anti-Smith Antibody; Future; Expected date: 06/04/2025  -     Anti-Scleroderma Antibody; Future; Expected date: 06/04/2025  -     Anti-Histone Antibody; Future; Expected date: 06/04/2025  -     Anti-DNA Ab, Double-Stranded; Future; Expected date: 06/04/2025  -     Anti Sm/RNP Antibody; Future; Expected date: 06/04/2025  -     FUAD Screen w/Reflex; Future; Expected date: 06/04/2025  -     Urinalysis; Future; Expected date: 06/04/2025  -     T4, Free; Future; Expected date: 06/04/2025  -     TSH; Future; Expected date: 06/04/2025  -     T3, Free; Future; Expected date: 06/04/2025          1. labs  2. Nurse injections  3. Norco/ prednisone     Follow-up 6 months    More than 50% of the  47 minute encounter was spent face to face counseling the patient regarding current status and future plan of care as well as side effects  of the medications. All questions were answered to patient's satisfaction also includes  non-face to face time preparing to see the patient (eg, review of tests), Obtaining and/or reviewing separately obtained history, Documenting clinical information in the electronic or other health record, Independently interpreting results            [1]   Social History  Tobacco Use    Smoking status: Every Day     Current packs/day: 0.50     Average packs/day: 0.5 packs/day for 55.7 years (27.9 ttl pk-yrs)     Types: Cigarettes     Start date: 9/18/1969    Smokeless tobacco: Never    Tobacco comments:     Patient has smoked off/on since she was 14

## 2025-07-28 DIAGNOSIS — L95.8 URTICARIAL VASCULITIS: ICD-10-CM

## 2025-07-28 DIAGNOSIS — M35.0B SJOGREN SYNDROME WITH VASCULITIS: ICD-10-CM

## 2025-07-28 DIAGNOSIS — L93.1 SUBACUTE CUTANEOUS LUPUS ERYTHEMATOSUS: ICD-10-CM

## 2025-07-28 RX ORDER — PREDNISONE 1 MG/1
4 TABLET ORAL DAILY PRN
Qty: 120 TABLET | Refills: 1 | Status: SHIPPED | OUTPATIENT
Start: 2025-07-28

## 2025-08-08 DIAGNOSIS — E53.8 B12 DEFICIENCY: ICD-10-CM

## 2025-08-12 RX ORDER — CYANOCOBALAMIN 1000 UG/ML
1000 INJECTION, SOLUTION INTRAMUSCULAR; SUBCUTANEOUS
Qty: 3 ML | Refills: 0 | OUTPATIENT
Start: 2025-08-12

## 2025-08-21 ENCOUNTER — TELEPHONE (OUTPATIENT)
Dept: RHEUMATOLOGY | Facility: CLINIC | Age: 70
End: 2025-08-21
Payer: MEDICARE

## 2025-08-27 LAB
ALBUMIN SERPL-MCNC: 3.7 G/DL (ref 3.6–5.1)
ALBUMIN/GLOB SERPL: 1 (CALC) (ref 1–2.5)
ALP SERPL-CCNC: 63 U/L (ref 37–153)
ALT SERPL-CCNC: 9 U/L (ref 6–29)
ANA SER QL IF: NEGATIVE
APPEARANCE UR: CLEAR
AST SERPL-CCNC: 14 U/L (ref 10–35)
BACTERIA #/AREA URNS HPF: ABNORMAL /HPF
BASOPHILS # BLD AUTO: 32 CELLS/UL (ref 0–200)
BASOPHILS NFR BLD AUTO: 0.6 %
BILIRUB SERPL-MCNC: 0.3 MG/DL (ref 0.2–1.2)
BILIRUB UR QL STRIP: NEGATIVE
BUN SERPL-MCNC: 21 MG/DL (ref 7–25)
BUN/CREAT SERPL: 19 (CALC) (ref 6–22)
CALCIUM SERPL-MCNC: 9.3 MG/DL (ref 8.6–10.4)
CHLORIDE SERPL-SCNC: 105 MMOL/L (ref 98–110)
CO2 SERPL-SCNC: 26 MMOL/L (ref 20–32)
COLOR UR: YELLOW
CREAT SERPL-MCNC: 1.11 MG/DL (ref 0.6–1)
DSDNA AB SER-ACNC: <1 IU/ML
EGFR: 53 ML/MIN/1.73M2
ENA SCL70 AB SER IA-ACNC: NORMAL AI
ENA SM AB SER IA-ACNC: NORMAL AI
ENA SM+RNP AB SER IA-ACNC: NORMAL AI
ENA SS-A AB SER IA-ACNC: ABNORMAL AI
ENA SS-B AB SER IA-ACNC: ABNORMAL AI
EOSINOPHIL # BLD AUTO: 70 CELLS/UL (ref 15–500)
EOSINOPHIL NFR BLD AUTO: 1.3 %
ERYTHROCYTE [DISTWIDTH] IN BLOOD BY AUTOMATED COUNT: 17.1 % (ref 11–15)
ERYTHROCYTE [SEDIMENTATION RATE] IN BLOOD BY WESTERGREN METHOD: 25 MM/H
GLOBULIN SER CALC-MCNC: 3.6 G/DL (CALC) (ref 1.9–3.7)
GLUCOSE SERPL-MCNC: 98 MG/DL (ref 65–99)
GLUCOSE UR QL STRIP: NEGATIVE
HCT VFR BLD AUTO: 39.6 % (ref 35–45)
HGB BLD-MCNC: 11.7 G/DL (ref 11.7–15.5)
HGB UR QL STRIP: NEGATIVE
HISTONE AB SER IA-ACNC: <1 U
HYALINE CASTS #/AREA URNS LPF: ABNORMAL /LPF
IRON SATN MFR SERPL: 23 % (CALC) (ref 16–45)
IRON SERPL-MCNC: 65 MCG/DL (ref 45–160)
KETONES UR QL STRIP: NEGATIVE
LEUKOCYTE ESTERASE UR QL STRIP: NEGATIVE
LYMPHOCYTES # BLD AUTO: 1777 CELLS/UL (ref 850–3900)
LYMPHOCYTES NFR BLD AUTO: 32.9 %
MCH RBC QN AUTO: 26.4 PG (ref 27–33)
MCHC RBC AUTO-ENTMCNC: 29.5 G/DL (ref 32–36)
MCV RBC AUTO: 89.4 FL (ref 80–100)
MONOCYTES # BLD AUTO: 319 CELLS/UL (ref 200–950)
MONOCYTES NFR BLD AUTO: 5.9 %
NEUTROPHILS # BLD AUTO: 3202 CELLS/UL (ref 1500–7800)
NEUTROPHILS NFR BLD AUTO: 59.3 %
NITRITE UR QL STRIP: NEGATIVE
PH UR STRIP: 6 [PH] (ref 5–8)
PLATELET # BLD AUTO: 181 THOUSAND/UL (ref 140–400)
PMV BLD REES-ECKER: 10.3 FL (ref 7.5–12.5)
POTASSIUM SERPL-SCNC: 4 MMOL/L (ref 3.5–5.3)
PROT SERPL-MCNC: 7.3 G/DL (ref 6.1–8.1)
PROT UR QL STRIP: ABNORMAL
RBC # BLD AUTO: 4.43 MILLION/UL (ref 3.8–5.1)
RBC #/AREA URNS HPF: ABNORMAL /HPF
SERVICE CMNT-IMP: NORMAL
SODIUM SERPL-SCNC: 140 MMOL/L (ref 135–146)
SP GR UR STRIP: 1.01 (ref 1–1.03)
SQUAMOUS #/AREA URNS HPF: ABNORMAL /HPF
T3FREE SERPL-MCNC: 2.8 PG/ML (ref 2.3–4.2)
T4 FREE SERPL-MCNC: 1.1 NG/DL (ref 0.8–1.8)
TIBC SERPL-MCNC: 287 MCG/DL (CALC) (ref 250–450)
TRANSFERRIN SERPL-MCNC: 227 MG/DL (ref 188–341)
TSH SERPL-ACNC: 2.95 MIU/L (ref 0.4–4.5)
WBC # BLD AUTO: 5.4 THOUSAND/UL (ref 3.8–10.8)
WBC #/AREA URNS HPF: ABNORMAL /HPF